# Patient Record
Sex: FEMALE | Race: WHITE | Employment: PART TIME | URBAN - METROPOLITAN AREA
[De-identification: names, ages, dates, MRNs, and addresses within clinical notes are randomized per-mention and may not be internally consistent; named-entity substitution may affect disease eponyms.]

---

## 2017-06-12 DIAGNOSIS — F32.A DEPRESSION, UNSPECIFIED DEPRESSION TYPE: ICD-10-CM

## 2017-06-12 RX ORDER — FLUOXETINE HYDROCHLORIDE 20 MG/1
20 CAPSULE ORAL DAILY
Qty: 30 CAP | Refills: 11 | Status: SHIPPED | OUTPATIENT
Start: 2017-06-12 | End: 2017-10-20 | Stop reason: SDUPTHER

## 2017-06-26 RX ORDER — SERTRALINE HYDROCHLORIDE 50 MG/1
50 TABLET, FILM COATED ORAL DAILY
Qty: 90 TAB | Refills: 3 | OUTPATIENT
Start: 2017-06-26

## 2017-06-26 NOTE — TELEPHONE ENCOUNTER
Refused refill request for Zoloft. Most recent note from Dr. Tisa Lundborg says that she is taking Prozac, not Zoloft.   Need more information on what she needs refilled

## 2017-06-28 NOTE — TELEPHONE ENCOUNTER
Still unable to reach her or leave a message on either line. Called [facility] and pharmacist said last Rx filled was for Prozac 30 day supply in January and she didn't request a refill. He said he thinks they sent it by mistake.

## 2017-10-20 ENCOUNTER — OFFICE VISIT (OUTPATIENT)
Dept: FAMILY MEDICINE CLINIC | Age: 32
End: 2017-10-20

## 2017-10-20 ENCOUNTER — TELEPHONE (OUTPATIENT)
Dept: FAMILY MEDICINE CLINIC | Age: 32
End: 2017-10-20

## 2017-10-20 VITALS
HEIGHT: 64 IN | HEART RATE: 69 BPM | RESPIRATION RATE: 17 BRPM | SYSTOLIC BLOOD PRESSURE: 113 MMHG | OXYGEN SATURATION: 98 % | BODY MASS INDEX: 24.24 KG/M2 | DIASTOLIC BLOOD PRESSURE: 74 MMHG | WEIGHT: 142 LBS | TEMPERATURE: 98 F

## 2017-10-20 DIAGNOSIS — F32.A DEPRESSION, UNSPECIFIED DEPRESSION TYPE: Primary | ICD-10-CM

## 2017-10-20 DIAGNOSIS — Z13.29 SCREENING FOR THYROID DISORDER: ICD-10-CM

## 2017-10-20 DIAGNOSIS — G43.901 MIGRAINE WITH STATUS MIGRAINOSUS, NOT INTRACTABLE, UNSPECIFIED MIGRAINE TYPE: ICD-10-CM

## 2017-10-20 DIAGNOSIS — Z13.220 SCREENING FOR LIPID DISORDERS: ICD-10-CM

## 2017-10-20 DIAGNOSIS — E66.3 OVERWEIGHT: ICD-10-CM

## 2017-10-20 RX ORDER — BUTALBITAL, ACETAMINOPHEN, CAFFEINE AND CODEINE PHOSPHATE 50; 325; 40; 30 MG/1; MG/1; MG/1; MG/1
1 CAPSULE ORAL
Qty: 30 CAP | Refills: 0 | Status: SHIPPED | OUTPATIENT
Start: 2017-10-20 | End: 2020-02-27 | Stop reason: ALTCHOICE

## 2017-10-20 RX ORDER — ATENOLOL 25 MG/1
25 TABLET ORAL DAILY
Qty: 30 TAB | Refills: 3 | Status: SHIPPED | OUTPATIENT
Start: 2017-10-20 | End: 2020-02-27 | Stop reason: ALTCHOICE

## 2017-10-20 RX ORDER — FLUOXETINE HYDROCHLORIDE 20 MG/1
20 CAPSULE ORAL EVERY EVENING
Qty: 30 CAP | Refills: 11 | Status: SHIPPED | OUTPATIENT
Start: 2017-10-20 | End: 2020-02-27 | Stop reason: ALTCHOICE

## 2017-10-20 NOTE — PATIENT INSTRUCTIONS
Depression Treatment: Care Instructions  Your Care Instructions  Depression is a condition that affects the way you feel, think, and act. It causes symptoms such as low energy, loss of interest in daily activities, and sadness or grouchiness that goes on for a long time. Depression is very common and affects men and women of all ages. Depression is a medical illness caused by changes in the natural chemicals in your brain. It is not a character flaw, and it does not mean that you are a bad or weak person. It does not mean that you are going crazy. It is important to know that depression can be treated. Medicines, counseling, and self-care can all help. Many people do not get help because they are embarrassed or think that they will get over the depression on their own. But some people do not get better without treatment. Follow-up care is a key part of your treatment and safety. Be sure to make and go to all appointments, and call your doctor if you are having problems. It's also a good idea to know your test results and keep a list of the medicines you take. How can you care for yourself at home? Learn about antidepressant medicines  Antidepressant medicines can improve or end the symptoms of depression. You may need to take the medicine for at least 6 months, and often longer. Keep taking your medicine even if you feel better. If you stop taking it too soon, your symptoms may come back or get worse. You may start to feel better within 1 to 3 weeks of taking antidepressant medicine. But it can take as many as 6 to 8 weeks to see more improvement. Talk to your doctor if you have problems with your medicine or if you do not notice any improvement after 3 weeks. Antidepressants can make you feel tired, dizzy, or nervous. Some people have dry mouth, constipation, headaches, sexual problems, an upset stomach, or diarrhea.  Many of these side effects are mild and go away on their own after you take the medicine for a few weeks. Some may last longer. Talk to your doctor if side effects bother you too much. You might be able to try a different medicine. If you are pregnant or breastfeeding, talk to your doctor about what medicines you can take. Learn about counseling  In many cases, counseling can work as well as medicines to treat mild to moderate depression. Counseling is done by licensed mental health providers, such as psychologists, social workers, and some types of nurses. It can be done in one-on-one sessions or in a group setting. Many people find group sessions helpful. Cognitive-behavioral therapy is a type of counseling. In this treatment therapy, you learn how to see and change unhelpful thinking styles that may be adding to your depression. Counseling and medicines often work well when used together. To manage depression  · Be physically active. Getting 30 minutes of exercise each day is good for your body and your mind. Begin slowly if it is hard for you to get started. If you already exercise, keep it up. · Plan something pleasant for yourself every day. Include activities that you have enjoyed in the past.  · Get enough sleep. Talk to your doctor if you have problems sleeping. · Eat a balanced diet. If you do not feel hungry, eat small snacks rather than large meals. · Do not drink alcohol, use illegal drugs, or take medicines that your doctor has not prescribed for you. They may interfere with your treatment. · Spend time with family and friends. It may help to speak openly about your depression with people you trust.  · Take your medicines exactly as prescribed. Call your doctor if you think you are having a problem with your medicine. · Do not make major life decisions while you are depressed. Depression may change the way you think. You will be able to make better decisions after you feel better. · Think positively.  Challenge negative thoughts with statements such as \"I am hopeful\"; \"Things will get better\"; and \"I can ask for the help I need. \" Write down these statements and read them often, even if you don't believe them yet. · Be patient with yourself. It took time for your depression to develop, and it will take time for your symptoms to improve. Do not take on too much or be too hard on yourself. · Learn all you can about depression from written and online materials. · Check out behavioral health classes to learn more about dealing with depression. · Keep the numbers for these national suicide hotlines: 3-959-450-TALK (5-885.843.2919) and 7-131-PJCSIOD (5-594.201.4981). If you or someone you know talks about suicide or feeling hopeless, get help right away. When should you call for help? Call 911 anytime you think you may need emergency care. For example, call if:  · You feel you cannot stop from hurting yourself or someone else. Call your doctor now or seek immediate medical care if:  · You hear voices. · You feel much more depressed. Watch closely for changes in your health, and be sure to contact your doctor if:  · You are having problems with your depression medicine. · You are not getting better as expected. Where can you learn more? Go to http://malcom-raphael.info/. Enter R179 in the search box to learn more about \"Depression Treatment: Care Instructions. \"  Current as of: July 26, 2016  Content Version: 11.3  © 4965-2492 [employer]. Care instructions adapted under license by [employer] (which disclaims liability or warranty for this information). If you have questions about a medical condition or this instruction, always ask your healthcare professional. [place] [address] any warranty or liability for your use of this information. Migraine Headache: Care Instructions  Your Care Instructions  Migraines are painful, throbbing headaches that often start on one side of the head.  They may cause nausea and vomiting and make you sensitive to light, sound, or smell. Without treatment, migraines can last from 4 hours to a few days. Medicines can help prevent migraines or stop them after they have started. Your doctor can help you find which ones work best for you. Follow-up care is a key part of your treatment and safety. Be sure to make and go to all appointments, and call your doctor if you are having problems. It's also a good idea to know your test results and keep a list of the medicines you take. How can you care for yourself at home? · Do not drive if you have taken a prescription pain medicine. · Rest in a quiet, dark room until your headache is gone. Close your eyes, and try to relax or go to sleep. Don't watch TV or read. · Put a cold, moist cloth or cold pack on the painful area for 10 to 20 minutes at a time. Put a thin cloth between the cold pack and your skin. · Use a warm, moist towel or a heating pad set on low to relax tight shoulder and neck muscles. · Have someone gently massage your neck and shoulders. · Take your medicines exactly as prescribed. Call your doctor if you think you are having a problem with your medicine. You will get more details on the specific medicines your doctor prescribes. · Be careful not to take pain medicine more often than the instructions allow. You could get worse or more frequent headaches when the medicine wears off. To prevent migraines  · Keep a headache diary so you can figure out what triggers your headaches. Avoiding triggers may help you prevent headaches. Record when each headache began, how long it lasted, and what the pain was like. (Was it throbbing, aching, stabbing, or dull?) Write down any other symptoms you had with the headache, such as nausea, flashing lights or dark spots, or sensitivity to bright light or loud noise. Note if the headache occurred near your period. List anything that might have triggered the headache.  Triggers may include certain foods (chocolate, cheese, wine) or odors, smoke, bright light, stress, or lack of sleep. · If your doctor has prescribed medicine for your migraines, take it as directed. You may have medicine that you take only when you get a migraine and medicine that you take all the time to help prevent migraines. ¨ If your doctor has prescribed medicine for when you get a headache, take it at the first sign of a migraine, unless your doctor has given you other instructions. ¨ If your doctor has prescribed medicine to prevent migraines, take it exactly as prescribed. Call your doctor if you think you are having a problem with your medicine. · Find healthy ways to deal with stress. Migraines are most common during or right after stressful times. Take time to relax before and after you do something that has caused a migraine in the past.  · Try to keep your muscles relaxed by keeping good posture. Check your jaw, face, neck, and shoulder muscles for tension. Try to relax them. When you sit at a desk, change positions often. And make sure to stretch for 30 seconds each hour. · Get plenty of sleep and exercise. · Eat meals on a regular schedule. Avoid foods and drinks that often trigger migraines. These include chocolate, alcohol (especially red wine and port), aspartame, monosodium glutamate (MSG), and some additives found in foods (such as hot dogs, verma, cold cuts, aged cheeses, and pickled foods). · Limit caffeine. Don't drink too much coffee, tea, or soda. But don't quit caffeine suddenly. That can also give you migraines. · Do not smoke or allow others to smoke around you. If you need help quitting, talk to your doctor about stop-smoking programs and medicines. These can increase your chances of quitting for good. · If you are taking birth control pills or hormone therapy, talk to your doctor about whether they are triggering your migraines. When should you call for help?   Call 911 anytime you think you may need emergency care. For example, call if:  · You have signs of a stroke. These may include:  ¨ Sudden numbness, paralysis, or weakness in your face, arm, or leg, especially on only one side of your body. ¨ Sudden vision changes. ¨ Sudden trouble speaking. ¨ Sudden confusion or trouble understanding simple statements. ¨ Sudden problems with walking or balance. ¨ A sudden, severe headache that is different from past headaches. Call your doctor now or seek immediate medical care if:  · You have new or worse nausea and vomiting. · You have a new or higher fever. · Your headache gets much worse. Watch closely for changes in your health, and be sure to contact your doctor if:  · You are not getting better after 2 days (48 hours). Where can you learn more? Go to http://malcom-raphael.info/. Enter W751 in the search box to learn more about \"Migraine Headache: Care Instructions. \"  Current as of: October 14, 2016  Content Version: 11.3  © 7648-9269 [employer]. Care instructions adapted under license by [employer] (which disclaims liability or warranty for this information). If you have questions about a medical condition or this instruction, always ask your healthcare professional. [place] [address] any warranty or liability for your use of this information.

## 2017-10-20 NOTE — MR AVS SNAPSHOT
Visit Information Date & Time Provider Department Dept. Phone Encounter #  
 10/20/2017 10:00 AM Aminata Mcmahon, [facility] [place] [address] [phone] [id number] Follow-up Instructions Return in about 4 weeks (around 11/17/2017), or if symptoms worsen or fail to improve. Upcoming Health Maintenance Date Due DTaP/Tdap/Td series (1 - Tdap) 2/15/2004 INFLUENZA AGE 9 TO ADULT 8/1/2017 PAP AKA CERVICAL CYTOLOGY 1/13/2019 Allergies as of 10/20/2017  Review Complete On: 10/20/2017 By: Aminata Mcmahon NP Severity Noted Reaction Type Reactions Imitrex [Sumatriptan Succinate] Medium 11/20/2013   Systemic Other (comments) Made patient feel funny Current Immunizations  Reviewed on 12/9/2014 Name Date Influenza Vaccine Gabriella Bloodgood) 12/9/2014 Not reviewed this visit You Were Diagnosed With   
  
 Codes Comments Depression, unspecified depression type    -  Primary ICD-10-CM: F32.9 ICD-9-CM: 880 Migraine with status migrainosus, not intractable, unspecified migraine type     ICD-10-CM: G43.901 ICD-9-CM: 346.92 Vitals BP Pulse Temp Resp Height(growth percentile) Weight(growth percentile) 113/74 (BP 1 Location: Left arm, BP Patient Position: Sitting) 69 98 °F (36.7 °C) (Oral) 17 5' 4\" (1.626 m) 142 lb (64.4 kg) SpO2 Breastfeeding? BMI OB Status Smoking Status 98% No 24.37 kg/m2 Unknown Never Smoker Vitals History BMI and BSA Data Body Mass Index Body Surface Area  
 24.37 kg/m 2 1.71 m 2 Preferred Pharmacy Pharmacy Name Phone [facility] [address] [facility], [facility] [phone] Your Updated Medication List  
  
   
This list is accurate as of: 10/20/17 10:36 AM.  Always use your most recent med list.  
  
  
  
  
 atenolol 25 mg tablet Commonly known as:  TENORMIN Take 1 Tab by mouth daily. codeine-butalbital-acetaminophen-caffeine -64-30 mg per capsule Commonly known as:  FIORICET WITH CODEINE Take 1 Cap by mouth every six (6) hours as needed for Migraine. FLUoxetine 20 mg capsule Commonly known as:  PROzac Take 1 Cap by mouth every evening. phentermine 37.5 mg tablet Commonly known as:  ADIPEX-P Take 1 Tab by mouth every morning. Max Daily Amount: 37.5 mg.  
  
  
  
  
Prescriptions Printed Refills  
 codeine-butalbital-acetaminophen-caffeine (FIORICET WITH CODEINE) -40-30 mg per capsule 0 Sig: Take 1 Cap by mouth every six (6) hours as needed for Migraine. Class: Print Route: Oral  
  
Prescriptions Sent to Pharmacy Refills FLUoxetine (PROZAC) 20 mg capsule 11 Sig: Take 1 Cap by mouth every evening. Class: Normal  
 Pharmacy: [address], [facility] Ph #: [phone] Route: Oral  
 atenolol (TENORMIN) 25 mg tablet 3 Sig: Take 1 Tab by mouth daily. Class: Normal  
 Pharmacy: [address], [facility] Ph #: [phone] Route: Oral  
  
Follow-up Instructions Return in about 4 weeks (around 11/17/2017), or if symptoms worsen or fail to improve. Patient Instructions Depression Treatment: Care Instructions Your Care Instructions Depression is a condition that affects the way you feel, think, and act. It causes symptoms such as low energy, loss of interest in daily activities, and sadness or grouchiness that goes on for a long time. Depression is very common and affects men and women of all ages. Depression is a medical illness caused by changes in the natural chemicals in your brain. It is not a character flaw, and it does not mean that you are a bad or weak person. It does not mean that you are going crazy. It is important to know that depression can be treated.  Medicines, counseling, and self-care can all help. Many people do not get help because they are embarrassed or think that they will get over the depression on their own. But some people do not get better without treatment. Follow-up care is a key part of your treatment and safety. Be sure to make and go to all appointments, and call your doctor if you are having problems. It's also a good idea to know your test results and keep a list of the medicines you take. How can you care for yourself at home? Learn about antidepressant medicines Antidepressant medicines can improve or end the symptoms of depression. You may need to take the medicine for at least 6 months, and often longer. Keep taking your medicine even if you feel better. If you stop taking it too soon, your symptoms may come back or get worse. You may start to feel better within 1 to 3 weeks of taking antidepressant medicine. But it can take as many as 6 to 8 weeks to see more improvement. Talk to your doctor if you have problems with your medicine or if you do not notice any improvement after 3 weeks. Antidepressants can make you feel tired, dizzy, or nervous. Some people have dry mouth, constipation, headaches, sexual problems, an upset stomach, or diarrhea. Many of these side effects are mild and go away on their own after you take the medicine for a few weeks. Some may last longer. Talk to your doctor if side effects bother you too much. You might be able to try a different medicine. If you are pregnant or breastfeeding, talk to your doctor about what medicines you can take. Learn about counseling In many cases, counseling can work as well as medicines to treat mild to moderate depression. Counseling is done by licensed mental health providers, such as psychologists, social workers, and some types of nurses. It can be done in one-on-one sessions or in a group setting. Many people find group sessions helpful. Cognitive-behavioral therapy is a type of counseling. In this treatment therapy, you learn how to see and change unhelpful thinking styles that may be adding to your depression. Counseling and medicines often work well when used together. To manage depression · Be physically active. Getting 30 minutes of exercise each day is good for your body and your mind. Begin slowly if it is hard for you to get started. If you already exercise, keep it up. · Plan something pleasant for yourself every day. Include activities that you have enjoyed in the past. 
· Get enough sleep. Talk to your doctor if you have problems sleeping. · Eat a balanced diet. If you do not feel hungry, eat small snacks rather than large meals. · Do not drink alcohol, use illegal drugs, or take medicines that your doctor has not prescribed for you. They may interfere with your treatment. · Spend time with family and friends. It may help to speak openly about your depression with people you trust. 
· Take your medicines exactly as prescribed. Call your doctor if you think you are having a problem with your medicine. · Do not make major life decisions while you are depressed. Depression may change the way you think. You will be able to make better decisions after you feel better. · Think positively. Challenge negative thoughts with statements such as \"I am hopeful\"; \"Things will get better\"; and \"I can ask for the help I need. \" Write down these statements and read them often, even if you don't believe them yet. · Be patient with yourself. It took time for your depression to develop, and it will take time for your symptoms to improve. Do not take on too much or be too hard on yourself. · Learn all you can about depression from written and online materials. · Check out behavioral health classes to learn more about dealing with depression.  
· Keep the numbers for these national suicide hotlines: 4-930-154-TALK (5-106-732-898.309.8721) and 9-121-TBJUHMT (2-805.930.1439). If you or someone you know talks about suicide or feeling hopeless, get help right away. When should you call for help? Call 911 anytime you think you may need emergency care. For example, call if: 
· You feel you cannot stop from hurting yourself or someone else. Call your doctor now or seek immediate medical care if: 
· You hear voices. · You feel much more depressed. Watch closely for changes in your health, and be sure to contact your doctor if: 
· You are having problems with your depression medicine. · You are not getting better as expected. Where can you learn more? Go to http://malcom-raphael.info/. Enter D586 in the search box to learn more about \"Depression Treatment: Care Instructions. \" Current as of: July 26, 2016 Content Version: 11.3 © 2176-8626 AppBrick. Care instructions adapted under license by [employer] (which disclaims liability or warranty for this information). If you have questions about a medical condition or this instruction, always ask your healthcare professional. [place] [address] any warranty or liability for your use of this information. Migraine Headache: Care Instructions Your Care Instructions Migraines are painful, throbbing headaches that often start on one side of the head. They may cause nausea and vomiting and make you sensitive to light, sound, or smell. Without treatment, migraines can last from 4 hours to a few days. Medicines can help prevent migraines or stop them after they have started. Your doctor can help you find which ones work best for you. Follow-up care is a key part of your treatment and safety. Be sure to make and go to all appointments, and call your doctor if you are having problems. It's also a good idea to know your test results and keep a list of the medicines you take. How can you care for yourself at home? · Do not drive if you have taken a prescription pain medicine. · Rest in a quiet, dark room until your headache is gone. Close your eyes, and try to relax or go to sleep. Don't watch TV or read. · Put a cold, moist cloth or cold pack on the painful area for 10 to 20 minutes at a time. Put a thin cloth between the cold pack and your skin. · Use a warm, moist towel or a heating pad set on low to relax tight shoulder and neck muscles. · Have someone gently massage your neck and shoulders. · Take your medicines exactly as prescribed. Call your doctor if you think you are having a problem with your medicine. You will get more details on the specific medicines your doctor prescribes. · Be careful not to take pain medicine more often than the instructions allow. You could get worse or more frequent headaches when the medicine wears off. To prevent migraines · Keep a headache diary so you can figure out what triggers your headaches. Avoiding triggers may help you prevent headaches. Record when each headache began, how long it lasted, and what the pain was like. (Was it throbbing, aching, stabbing, or dull?) Write down any other symptoms you had with the headache, such as nausea, flashing lights or dark spots, or sensitivity to bright light or loud noise. Note if the headache occurred near your period. List anything that might have triggered the headache. Triggers may include certain foods (chocolate, cheese, wine) or odors, smoke, bright light, stress, or lack of sleep. · If your doctor has prescribed medicine for your migraines, take it as directed. You may have medicine that you take only when you get a migraine and medicine that you take all the time to help prevent migraines. ¨ If your doctor has prescribed medicine for when you get a headache, take it at the first sign of a migraine, unless your doctor has given you other instructions.  
¨ If your doctor has prescribed medicine to prevent migraines, take it exactly as prescribed. Call your doctor if you think you are having a problem with your medicine. · Find healthy ways to deal with stress. Migraines are most common during or right after stressful times. Take time to relax before and after you do something that has caused a migraine in the past. 
· Try to keep your muscles relaxed by keeping good posture. Check your jaw, face, neck, and shoulder muscles for tension. Try to relax them. When you sit at a desk, change positions often. And make sure to stretch for 30 seconds each hour. · Get plenty of sleep and exercise. · Eat meals on a regular schedule. Avoid foods and drinks that often trigger migraines. These include chocolate, alcohol (especially red wine and port), aspartame, monosodium glutamate (MSG), and some additives found in foods (such as hot dogs, verma, cold cuts, aged cheeses, and pickled foods). · Limit caffeine. Don't drink too much coffee, tea, or soda. But don't quit caffeine suddenly. That can also give you migraines. · Do not smoke or allow others to smoke around you. If you need help quitting, talk to your doctor about stop-smoking programs and medicines. These can increase your chances of quitting for good. · If you are taking birth control pills or hormone therapy, talk to your doctor about whether they are triggering your migraines. When should you call for help? Call 911 anytime you think you may need emergency care. For example, call if: 
· You have signs of a stroke. These may include: 
¨ Sudden numbness, paralysis, or weakness in your face, arm, or leg, especially on only one side of your body. ¨ Sudden vision changes. ¨ Sudden trouble speaking. ¨ Sudden confusion or trouble understanding simple statements. ¨ Sudden problems with walking or balance. ¨ A sudden, severe headache that is different from past headaches. Call your doctor now or seek immediate medical care if: 
· You have new or worse nausea and vomiting. · You have a new or higher fever. · Your headache gets much worse. Watch closely for changes in your health, and be sure to contact your doctor if: 
· You are not getting better after 2 days (48 hours). Where can you learn more? Go to http://malcom-raphael.info/. Enter A424 in the search box to learn more about \"Migraine Headache: Care Instructions. \" Current as of: October 14, 2016 Content Version: 11.3 © 8913-5193 AutoNavi. Care instructions adapted under license by Graph[employer]ly (which disclaims liability or warranty for this information). If you have questions about a medical condition or this instruction, always ask your healthcare professional. [place] [address] any warranty or liability for your use of this information. Introducing [facility]! [address] introduces Cnekt patient portal. Now you can access parts of your medical record, email your doctor's office, and request medication refills online. 1. In your internet browser, go to [email or web address] 2. Click on the First Time User? Click Here link in the Sign In box. You will see the New Member Sign Up page. 3. Enter your Cnekt Access Code exactly as it appears below. You will not need to use this code after youve completed the sign-up process. If you do not sign up before the expiration date, you must request a new code. · Cnekt Access Code: Ganga Coker Expires: 1/18/2018  8:43 AM 
 
4. Enter the last four digits of your Social Security Number (xxxx) and Date of Birth (mm/dd/yyyy) as indicated and click Submit. You will be taken to the next sign-up page. 5. Create a Cnekt ID. This will be your Cnekt login ID and cannot be changed, so think of one that is secure and easy to remember. 6. Create a Cnekt password. You can change your password at any time. 7. Enter your Password Reset Question and Answer.  This can be used at a later time if you forget your password. 8. Enter your e-mail address. You will receive e-mail notification when new information is available in [address] 9. Click Sign Up. You can now view and download portions of your medical record. 10. Click the Download Summary menu link to download a portable copy of your medical information. If you have questions, please visit the Frequently Asked Questions section of the BaseKit website. Remember, BaseKit is NOT to be used for urgent needs. For medical emergencies, dial 911. Now available from your iPhone and Android! Please provide this summary of care documentation to your next provider. Your primary care clinician is listed as CORBY PERAZA. If you have any questions after today's visit, please call [phone].

## 2017-10-20 NOTE — PROGRESS NOTES
Chief Complaint   Patient presents with    Migraine     patient wants medication for her Migraine    Medication Evaluation     needs to discuss her antidepressant medications    Medication Refill     phentermine and antidepressants     Patient declined the flu vaccine. Tristen Gramajo LPN

## 2017-10-20 NOTE — LETTER
NOTIFICATION RETURN TO WORK / SCHOOL 
 
10/20/2017 10:35 AM 
 
Ms. [address] [place] [address] [address] [facility] [address] To Whom It May Concern: 
 
Addison Sosa is currently under the care of [address] [place] [address]. She is being treated for depression and migraines and is prescribed daily medication for treatment. If there are questions or concerns please have the patient contact our office. Sincerely, Kiki Denver, NP

## 2017-10-20 NOTE — PROGRESS NOTES
Chief Complaint   Patient presents with    Migraine     patient wants medication for her Migraine    Medication Evaluation     needs to discuss her antidepressant medications    Medication Refill     phentermine and antidepressants       Ryanne Uribe is a 27 y.o. female who presents for complaints of depression/anxiety. Was on Prozac and says that was working well for her but she stopped taking the medication about 4-6 months \"I thought I did not need it anymore and could treat myself\" but says that about 2-3 months ago, she started to feel \"bad\" again. Extremely burton and snapping at kids (no patience), \"meltdowns\", tearful all the time, and having increase in migraines and not sleeping well. Her  was recently diagnosed with stage 4 cancer and she has had a lot of stressors related. She has 3 kids, ages 15, 6, and 3. She denies recreational drugs or ETOH. Denies SI/HI. Says she has a counseling appointment scheduled for next month. She also inquires about restarting phentermine, says that when she stresses out she overeats. She was prescribed the phentermine in the past by Dr Amarilis Pedroza. History of migraines- used to be on atenolol daily for prevention and Fioricet PRN acute migraine. When she was on her antidepressant she says she was not really having migraines but over the past month she has been having a migraine 2-3 times per week and lately, having a headache almost daily. She was able to stop the atenolol when she was on her Prozac regularly. Past Medical History:   Diagnosis Date    Depression 11/28/2016    Kidney stone     Ovarian cyst     Ovarian cyst, left     UTI (urinary tract infection)      Past Surgical History:   Procedure Laterality Date    HX GYN      tubaligation     Social History     Social History    Marital status:      Spouse name: N/A    Number of children: N/A    Years of education: N/A     Occupational History    Not on file. Social History Main Topics    Smoking status: Never Smoker    Smokeless tobacco: Never Used    Alcohol use Yes      Comment: socially    Drug use: No    Sexual activity: Yes     Partners: Male     Birth control/ protection: None     Other Topics Concern    Not on file     Social History Narrative     Allergies   Allergen Reactions    Imitrex [Sumatriptan Succinate] Other (comments)     Made patient feel funny           O;   Visit Vitals    /74 (BP 1 Location: Left arm, BP Patient Position: Sitting)    Pulse 69    Temp 98 °F (36.7 °C) (Oral)    Resp 17    Ht 5' 4\" (1.626 m)    Wt 142 lb (64.4 kg)    SpO2 98%    Breastfeeding No  Comment: patient is spotting only at times    BMI 24.37 kg/m2     Gen: alert, oriented, no acute distress  Head: normocephalic, atraumatic  Ears: external auditory canals clear, TMs without erythema or effusion  Eyes: pupils equal round reactive to light, sclera clear, conjunctiva clear  Nose: normal turbinates, no rhinorrhea  Oral: moist mucus membranes, no oral lesions, no pharyngeal inflammation or exudate  Neck: supple, no lymphadenopathy  Resp: no increased work of breathing, lungs clear to ausculation bilaterally  CV: S1, S2 normal, no murmurs, rubs, or gallops. Abd: soft, not tender, not distended. No hepatosplenomegaly. Normal bowel sounds. No hernias. Neuro: cranial nerves intact, normal strength and movement in all extremities, reflexes and sensation intact and symmetric. Skin: no lesion or rash  PSYCH:  Good eye contact, rational thoughts. Tearful during exam.    A/P:  Differential diagnosis and treatment options reviewed with patient who is in agreement with treatment plan as outlined below. ICD-10-CM ICD-9-CM    1. Depression, unspecified depression type F32.9 311 FLUoxetine (PROZAC) 20 mg capsule      METABOLIC PANEL, COMPREHENSIVE      CBC WITH AUTOMATED DIFF   2.  Migraine with status migrainosus, not intractable, unspecified migraine type G43.901 346.92 atenolol (TENORMIN) 25 mg tablet      codeine-butalbital-acetaminophen-caffeine (FIORICET WITH CODEINE) -51-30 mg per capsule      METABOLIC PANEL, COMPREHENSIVE      CBC WITH AUTOMATED DIFF   3. Overweight E66.3 278.02    4. Screening for thyroid disorder Z13.29 V77.0 TSH 3RD GENERATION   5. Screening for lipid disorders Z13.220 V77.91 LIPID PANEL       Will restart prozac and atenolol. Medication profiles discussed with patient. Discussed causes, disease state, treatment alternatives, possible side effects, and expected time to effectiveness for depression. Patient is aware of increased risk of suicidality when starting treatment and contracts for safety. Patient made aware that counseling in conjunction with medication works best to treat depression. Also discussed benefits of journaling symptoms and regular exercise during recovery. Patient agrees to treatment, identifies a source of social support, and will follow up here in 1 month. Spent >30 minutes face to face with patient, >50% coordinating care. Discussed BMI and healthy weight. BMI is not at a level that is appropriate for treatment with Phentermine but also explained that phentermine could induce her migraines and potentially increase her anxiety. Encouraged patient to work to implement changes including diet high in raw fruits and vegetables, lean protein and good fats. Limit refined, processed carbohydrates and sugar. Encouraged regular exercise. She states understanding. Fasting lab work ordered. She will return as lab visit. Follow up in 4-6 weeks or sooner if needed    Verbal and written instructions (see AVS) provided. Patient expresses understanding of diagnosis and treatment plan.

## 2017-10-23 ENCOUNTER — TELEPHONE (OUTPATIENT)
Dept: FAMILY MEDICINE CLINIC | Age: 32
End: 2017-10-23

## 2017-10-25 ENCOUNTER — LAB ONLY (OUTPATIENT)
Dept: FAMILY MEDICINE CLINIC | Age: 32
End: 2017-10-25

## 2017-10-26 LAB
ALBUMIN SERPL-MCNC: 4.3 G/DL (ref 3.5–5.5)
ALBUMIN/GLOB SERPL: 1.8 {RATIO} (ref 1.2–2.2)
ALP SERPL-CCNC: 58 IU/L (ref 39–117)
ALT SERPL-CCNC: 8 IU/L (ref 0–32)
AST SERPL-CCNC: 13 IU/L (ref 0–40)
BASOPHILS # BLD AUTO: 0 X10E3/UL (ref 0–0.2)
BASOPHILS NFR BLD AUTO: 1 %
BILIRUB SERPL-MCNC: 0.6 MG/DL (ref 0–1.2)
BUN SERPL-MCNC: 12 MG/DL (ref 6–20)
BUN/CREAT SERPL: 18 (ref 9–23)
CALCIUM SERPL-MCNC: 8.7 MG/DL (ref 8.7–10.2)
CHLORIDE SERPL-SCNC: 102 MMOL/L (ref 96–106)
CHOLEST SERPL-MCNC: 137 MG/DL (ref 100–199)
CO2 SERPL-SCNC: 25 MMOL/L (ref 18–29)
CREAT SERPL-MCNC: 0.67 MG/DL (ref 0.57–1)
EOSINOPHIL # BLD AUTO: 0.3 X10E3/UL (ref 0–0.4)
EOSINOPHIL NFR BLD AUTO: 4 %
ERYTHROCYTE [DISTWIDTH] IN BLOOD BY AUTOMATED COUNT: 13.6 % (ref 12.3–15.4)
GFR SERPLBLD CREATININE-BSD FMLA CKD-EPI: 115 ML/MIN/1.73
GFR SERPLBLD CREATININE-BSD FMLA CKD-EPI: 133 ML/MIN/1.73
GLOBULIN SER CALC-MCNC: 2.4 G/DL (ref 1.5–4.5)
GLUCOSE SERPL-MCNC: 83 MG/DL (ref 65–99)
HCT VFR BLD AUTO: 36 % (ref 34–46.6)
HDLC SERPL-MCNC: 46 MG/DL
HGB BLD-MCNC: 12 G/DL (ref 11.1–15.9)
IMM GRANULOCYTES # BLD: 0 X10E3/UL (ref 0–0.1)
IMM GRANULOCYTES NFR BLD: 0 %
INTERPRETATION, 910389: NORMAL
LDLC SERPL CALC-MCNC: 80 MG/DL (ref 0–99)
LYMPHOCYTES # BLD AUTO: 1.6 X10E3/UL (ref 0.7–3.1)
LYMPHOCYTES NFR BLD AUTO: 23 %
MCH RBC QN AUTO: 31.8 PG (ref 26.6–33)
MCHC RBC AUTO-ENTMCNC: 33.3 G/DL (ref 31.5–35.7)
MCV RBC AUTO: 96 FL (ref 79–97)
MONOCYTES # BLD AUTO: 0.4 X10E3/UL (ref 0.1–0.9)
MONOCYTES NFR BLD AUTO: 6 %
NEUTROPHILS # BLD AUTO: 4.8 X10E3/UL (ref 1.4–7)
NEUTROPHILS NFR BLD AUTO: 66 %
PLATELET # BLD AUTO: 199 X10E3/UL (ref 150–379)
POTASSIUM SERPL-SCNC: 4.2 MMOL/L (ref 3.5–5.2)
PROT SERPL-MCNC: 6.7 G/DL (ref 6–8.5)
RBC # BLD AUTO: 3.77 X10E6/UL (ref 3.77–5.28)
SODIUM SERPL-SCNC: 141 MMOL/L (ref 134–144)
TRIGL SERPL-MCNC: 54 MG/DL (ref 0–149)
TSH SERPL DL<=0.005 MIU/L-ACNC: 2.12 UIU/ML (ref 0.45–4.5)
VLDLC SERPL CALC-MCNC: 11 MG/DL (ref 5–40)
WBC # BLD AUTO: 7.1 X10E3/UL (ref 3.4–10.8)

## 2017-12-12 ENCOUNTER — DOCUMENTATION ONLY (OUTPATIENT)
Dept: FAMILY MEDICINE CLINIC | Age: 32
End: 2017-12-12

## 2017-12-12 NOTE — PROGRESS NOTES
12/12/17 Called [address] [place] [address] spoke with Simin Hightowre to follow up on note in system for Fioricet. No PA needed claim went thru without charge and will be filled stated Simin Hightower.

## 2018-01-16 ENCOUNTER — OFFICE VISIT (OUTPATIENT)
Dept: FAMILY MEDICINE CLINIC | Age: 33
End: 2018-01-16

## 2018-01-16 VITALS
RESPIRATION RATE: 14 BRPM | TEMPERATURE: 98.4 F | SYSTOLIC BLOOD PRESSURE: 126 MMHG | BODY MASS INDEX: 24.92 KG/M2 | HEART RATE: 74 BPM | HEIGHT: 64 IN | OXYGEN SATURATION: 99 % | WEIGHT: 146 LBS | DIASTOLIC BLOOD PRESSURE: 88 MMHG

## 2018-01-16 DIAGNOSIS — N30.00 ACUTE CYSTITIS WITHOUT HEMATURIA: ICD-10-CM

## 2018-01-16 DIAGNOSIS — M79.18 MYOFASCIAL PAIN: ICD-10-CM

## 2018-01-16 DIAGNOSIS — F41.1 GAD (GENERALIZED ANXIETY DISORDER): ICD-10-CM

## 2018-01-16 DIAGNOSIS — G44.219 EPISODIC TENSION-TYPE HEADACHE, NOT INTRACTABLE: Primary | ICD-10-CM

## 2018-01-16 DIAGNOSIS — R35.0 URINARY FREQUENCY: ICD-10-CM

## 2018-01-16 LAB
BILIRUB UR QL STRIP: NEGATIVE
GLUCOSE UR-MCNC: NEGATIVE MG/DL
KETONES P FAST UR STRIP-MCNC: NEGATIVE MG/DL
PH UR STRIP: 7 [PH] (ref 4.6–8)
PROT UR QL STRIP: NORMAL
SP GR UR STRIP: 1.02 (ref 1–1.03)
UA UROBILINOGEN AMB POC: NORMAL (ref 0.2–1)
URINALYSIS CLARITY POC: NORMAL
URINALYSIS COLOR POC: YELLOW
URINE BLOOD POC: NEGATIVE
URINE LEUKOCYTES POC: NORMAL
URINE NITRITES POC: POSITIVE

## 2018-01-16 RX ORDER — CIPROFLOXACIN 250 MG/1
250 TABLET, FILM COATED ORAL EVERY 12 HOURS
Qty: 6 TAB | Refills: 0 | Status: SHIPPED | OUTPATIENT
Start: 2018-01-16 | End: 2018-01-19

## 2018-01-16 RX ORDER — DULOXETIN HYDROCHLORIDE 30 MG/1
30 CAPSULE, DELAYED RELEASE ORAL DAILY
Qty: 90 CAP | Refills: 3 | Status: SHIPPED | OUTPATIENT
Start: 2018-01-16 | End: 2020-02-27 | Stop reason: ALTCHOICE

## 2018-01-16 NOTE — MR AVS SNAPSHOT
[address] [facility] 
 
 
 [address] [facility] 
[phone] Patient: Aliyah Neves MRN: [id number] FLOREZ:[id number] Visit Information Date & Time Provider Department Dept. Phone Encounter #  
 1/16/2018  3:20 PM Tarsha Mcbride MD [address] [facility] [phone] [id number] Upcoming Health Maintenance Date Due  
 PAP AKA CERVICAL CYTOLOGY 1/13/2019 DTaP/Tdap/Td series (2 - Td) 10/20/2027 Allergies as of 1/16/2018  Review Complete On: 1/16/2018 By: Julee Mahan Severity Noted Reaction Type Reactions Imitrex [Sumatriptan Succinate] Medium 11/20/2013   Systemic Other (comments) Made patient feel funny Current Immunizations  Reviewed on 12/9/2014 Name Date Influenza Vaccine Partpascale Devi) 12/9/2014 Not reviewed this visit You Were Diagnosed With   
  
 Codes Comments Urinary frequency    -  Primary ICD-10-CM: R35.0 ICD-9-CM: 788.41   
 CARSON (generalized anxiety disorder)     ICD-10-CM: F41.1 ICD-9-CM: 300.02 Episodic tension-type headache, not intractable     ICD-10-CM: S10.035 ICD-9-CM: 339.11 Myofascial pain     ICD-10-CM: M79.1 ICD-9-CM: 729.1 Acute cystitis without hematuria     ICD-10-CM: N30.00 ICD-9-CM: 595.0 Vitals BP Pulse Temp Resp Height(growth percentile) Weight(growth percentile) (!) 145/93 (BP 1 Location: Left arm, BP Patient Position: Sitting) 74 98.4 °F (36.9 °C) 14 5' 4\" (1.626 m) 146 lb (66.2 kg) SpO2 BMI OB Status Smoking Status 99% 25.06 kg/m2 Unknown Never Smoker BMI and BSA Data Body Mass Index Body Surface Area 25.06 kg/m 2 1.73 m 2 Preferred Pharmacy Pharmacy Name Phone [facility] [address], [address]co [address] [facility] [phone] Your Updated Medication List  
  
   
This list is accurate as of: 1/16/18  4:31 PM.  Always use your most recent med list.  
  
  
  
  
 atenolol 25 mg tablet Commonly known as:  TENORMIN Take 1 Tab by mouth daily. ciprofloxacin HCl 250 mg tablet Commonly known as:  CIPRO Take 1 Tab by mouth every twelve (12) hours for 3 days. codeine-butalbital-acetaminophen-caffeine -27-30 mg capsule Commonly known as:  FIORICET WITH CODEINE Take 1 Cap by mouth every six (6) hours as needed for Migraine. DULoxetine 30 mg capsule Commonly known as:  CYMBALTA Take 1 Cap by mouth daily. FLUoxetine 20 mg capsule Commonly known as:  PROzac Take 1 Cap by mouth every evening. Prescriptions Sent to Pharmacy Refills  
 ciprofloxacin HCl (CIPRO) 250 mg tablet 0 Sig: Take 1 Tab by mouth every twelve (12) hours for 3 days. Class: Normal  
 Pharmacy: [address] [place] [address], [address] [facility] Ph #: [phone] Route: Oral  
 DULoxetine (CYMBALTA) 30 mg capsule 3 Sig: Take 1 Cap by mouth daily. Class: Normal  
 Pharmacy: [address] [place] [address], [facility] [address] [facility] Ph #: [phone] Route: Oral  
  
We Performed the Following AMB POC URINALYSIS DIP STICK MANUAL W/O MICRO [50426 CPT(R)] Patient Instructions Neck Strain or Sprain: Rehab Exercises Your Care Instructions Here are some examples of typical rehabilitation exercises for your condition. Start each exercise slowly. Ease off the exercise if you start to have pain. Your doctor or physical therapist will tell you when you can start these exercises and which ones will work best for you. How to do the exercises Neck rotation 1. Sit in a firm chair, or stand up straight. 2. Keeping your chin level, turn your head to the right, and hold for 15 to 30 seconds. 3. Turn your head to the left and hold for 15 to 30 seconds. 4. Repeat 2 to 4 times to each side. Neck stretches 1. Look straight ahead, and tip your right ear to your right shoulder.  Do not let your left shoulder rise up as you tip your head to the right. 2. Hold for 15 to 30 seconds. 3. Tilt your head to the left. Do not let your right shoulder rise up as you tip your head to the left. 4. Hold for 15 to 30 seconds. 5. Repeat 2 to 4 times to each side. Forward neck flexion 1. Sit in a firm chair, or stand up straight. 2. Bend your head forward. 3. Hold for 15 to 30 seconds. 4. Repeat 2 to 4 times. Lateral (side) bend strengthening 1. With your right hand, place your first two fingers on your right temple. 2. Start to bend your head to the side while using gentle pressure from your fingers to keep your head from bending. 3. Hold for about 6 seconds. 4. Repeat 8 to 12 times. 5. Switch hands and repeat the same exercise on your left side. Forward bend strengthening 1. Place your first two fingers of either hand on your forehead. 2. Start to bend your head forward while using gentle pressure from your fingers to keep your head from bending. 3. Hold for about 6 seconds. 4. Repeat 8 to 12 times. Neutral position strengthening 1. Using one hand, place your fingertips on the back of your head at the top of your neck. 2. Start to bend your head backward while using gentle pressure from your fingers to keep your head from bending. 3. Hold for about 6 seconds. 4. Repeat 8 to 12 times. Chin tuck 1. Lie on the floor with a rolled-up towel under your neck. Your head should be touching the floor. 2. Slowly bring your chin toward your chest. 
3. Hold for a count of 6, and then relax for up to 10 seconds. 4. Repeat 8 to 12 times. Follow-up care is a key part of your treatment and safety. Be sure to make and go to all appointments, and call your doctor if you are having problems. It's also a good idea to know your test results and keep a list of the medicines you take. Where can you learn more? Go to http://malcom-raphael.info/. Enter M679 in the search box to learn more about \"Neck Strain or Sprain: Rehab Exercises. \" Current as of: March 21, 2017 Content Version: 11.4 © 0353-0430 [employer]ift[employer]. Care instructions adapted under license by [employer] (which disclaims liability or warranty for this information). If you have questions about a medical condition or this instruction, always ask your healthcare professional. [address] any warranty or liability for your use of this information. Introducing [facility]! Dear Lex Lizarraga: Thank you for requesting a [employer] account. Our records indicate that you already have an active [employer] account. You can access your account anytime at [email or web address] Did you know that you can access your hospital and ER discharge instructions at any time in [employer]? You can also review all of your test results from your hospital stay or ER visit. Additional Information If you have questions, please visit the Frequently Asked Questions section of the [employer] website at [email or web address] Remember, [employer] is NOT to be used for urgent needs. For medical emergencies, dial 911. Now available from your iPhone and Android! Please provide this summary of care documentation to your next provider. Your primary care clinician is listed as CORBY PERAZA. If you have any questions after today's visit, please call [phone].

## 2018-01-16 NOTE — PATIENT INSTRUCTIONS
Neck Strain or Sprain: Rehab Exercises  Your Care Instructions  Here are some examples of typical rehabilitation exercises for your condition. Start each exercise slowly. Ease off the exercise if you start to have pain. Your doctor or physical therapist will tell you when you can start these exercises and which ones will work best for you. How to do the exercises  Neck rotation    1. Sit in a firm chair, or stand up straight. 2. Keeping your chin level, turn your head to the right, and hold for 15 to 30 seconds. 3. Turn your head to the left and hold for 15 to 30 seconds. 4. Repeat 2 to 4 times to each side. Neck stretches    1. Look straight ahead, and tip your right ear to your right shoulder. Do not let your left shoulder rise up as you tip your head to the right. 2. Hold for 15 to 30 seconds. 3. Tilt your head to the left. Do not let your right shoulder rise up as you tip your head to the left. 4. Hold for 15 to 30 seconds. 5. Repeat 2 to 4 times to each side. Forward neck flexion    1. Sit in a firm chair, or stand up straight. 2. Bend your head forward. 3. Hold for 15 to 30 seconds. 4. Repeat 2 to 4 times. Lateral (side) bend strengthening    1. With your right hand, place your first two fingers on your right temple. 2. Start to bend your head to the side while using gentle pressure from your fingers to keep your head from bending. 3. Hold for about 6 seconds. 4. Repeat 8 to 12 times. 5. Switch hands and repeat the same exercise on your left side. Forward bend strengthening    1. Place your first two fingers of either hand on your forehead. 2. Start to bend your head forward while using gentle pressure from your fingers to keep your head from bending. 3. Hold for about 6 seconds. 4. Repeat 8 to 12 times. Neutral position strengthening    1. Using one hand, place your fingertips on the back of your head at the top of your neck.   2. Start to bend your head backward while using gentle pressure from your fingers to keep your head from bending. 3. Hold for about 6 seconds. 4. Repeat 8 to 12 times. Chin tuck    1. Lie on the floor with a rolled-up towel under your neck. Your head should be touching the floor. 2. Slowly bring your chin toward your chest.  3. Hold for a count of 6, and then relax for up to 10 seconds. 4. Repeat 8 to 12 times. Follow-up care is a key part of your treatment and safety. Be sure to make and go to all appointments, and call your doctor if you are having problems. It's also a good idea to know your test results and keep a list of the medicines you take. Where can you learn more? Go to http://malcom-raphael.info/. Enter M679 in the search box to learn more about \"Neck Strain or Sprain: Rehab Exercises. \"  Current as of: March 21, 2017  Content Version: 11.4  © 1178-8691 Healthwise, Incorporated. Care instructions adapted under license by [employer] (which disclaims liability or warranty for this information). If you have questions about a medical condition or this instruction, always ask your healthcare professional. [address] any warranty or liability for your use of this information.

## 2018-01-16 NOTE — PROGRESS NOTES
..  Chief Complaint   Patient presents with    Migraine    Back Pain    Urinary Frequency     . Juan Laura

## 2018-01-16 NOTE — PROGRESS NOTES
AUSTIN Coyne Aas is a 27 y.o. female who presents with a headache for the last 4 days and urinary frequency. Urinary symptoms since yesterday. Headache is an ongoing issue. She estimates that she has 3 headache days per month. This headache which has been going on for 4 days is the longest that she has ever had. Her headaches are typically bifrontal and can involve the back of the head and neck at times. Rarely if ever has unilateral headache but does frequently get nausea, light sensitivity, sound sensitivity  with her headaches. She has been trying Fioricet, Tylenol, Advil. Has been taking these so frequently that she now gets and upset stomach when she uses them. Cannot recall exactly how many days out of the week she has been taking Tylenol but thinks it is conceivable that she has been using it daily for a while    Is also seen for mood disorder a few months ago and started back on Prozac. Evidently she also wanted phentermine with this medication because Prozac \"makes me a zombie\" and she needed the phentermine to put some pep in her step. Talked about how bad of an idea this was. Apparently she is under a lot of stress right now because of her . He has stage IV colon cancer and is in a lot of pain. He was given 2 years to live and then did not tell his wife for 1 year so this came as a shock when she found out 6 months ago. The kids do not know. He is in a lot of pain but does not want pain medication because he has a history of \"a problem\" with prescription pain medication and he does not want to go down that road again. PMHx:  Past Medical History:   Diagnosis Date    Depression 11/28/2016    Kidney stone     Ovarian cyst     Ovarian cyst, left     UTI (urinary tract infection)        Meds:   Current Outpatient Prescriptions   Medication Sig Dispense Refill    ciprofloxacin HCl (CIPRO) 250 mg tablet Take 1 Tab by mouth every twelve (12) hours for 3 days.  6 Tab 0  DULoxetine (CYMBALTA) 30 mg capsule Take 1 Cap by mouth daily. 90 Cap 3    FLUoxetine (PROZAC) 20 mg capsule Take 1 Cap by mouth every evening. 30 Cap 11    atenolol (TENORMIN) 25 mg tablet Take 1 Tab by mouth daily. 30 Tab 3    codeine-butalbital-acetaminophen-caffeine (FIORICET WITH CODEINE) -36-30 mg per capsule Take 1 Cap by mouth every six (6) hours as needed for Migraine. 30 Cap 0       Allergies: Allergies   Allergen Reactions    Imitrex [Sumatriptan Succinate] Other (comments)     Made patient feel funny       Smoker:  History   Smoking Status    Never Smoker   Smokeless Tobacco    Never Used       ETOH:   History   Alcohol Use    Yes     Comment: socially       FH:   Family History   Problem Relation Age of Onset    Cancer Mother     Asthma Maternal Aunt        ROS:   As listed in HPI. In addition:  Constitutional:   No headache, fever, fatigue, weight loss or weight gain      Cardiac:    No chest pain      Resp:   No cough or shortness of breath      Neuro   No loss of consciousness, dizziness, seizures      Physical Exam:  Blood pressure 126/88, pulse 74, temperature 98.4 °F (36.9 °C), resp. rate 14, height 5' 4\" (1.626 m), weight 146 lb (66.2 kg), SpO2 99 %. GEN: No apparent distress. Alert and oriented and responds to all questions appropriately. NEUROLOGIC:  No focal neurologic deficits. Strength and sensation grossly intact. Coordination and gait grossly intact. EXT: Well perfused. No edema. SKIN: No obvious rashes. Neck: Rectus capitis muscles are exquisitely tender to palpation bilaterally, scalenes exquisitely tender on the left more so than the right, sternocleidomastoid exquisitely tender in the left more so than the right, superior trapezius trigger points both medially and laterally bilaterally, inferior trapezius trigger point bilaterally.   Trigger points throughout the erector spinae a at about the level of L4-L5 both medially and laterally       Assessment and Plan     In all >14 tender points. Pretty impressive myofascial pattern. Employ a cold stretch technique on the specific tender points that were referring pain to her head specifically the left superior trapezius. This resulted in immediate relief of her headache. Also employed isometric exercises on the splenius capitis with good relief. Talked about how heating pad, NSAIDs, stretches could be used in a similar fashion. Because of the extent of myofascial pain I suspect that her anxiety is playing a major role. This led to a discussion about how she is not under good control. Does not feel like the Prozac has made any impact. Offered increased dose versus change to medication like Cymbalta. She would like to try a change. Will switch over to Cymbalta and slightly increased relative dose to 30 mg. Would expect headache to go away in a few days if she is doing all of the things we talked about  Would expect improvement in mood in about 2 weeks on the Cymbalta. Stop taking Prozac  Stop taking Fioricet  Back off on Tylenol  NSAIDs as needed-if upsetting stomach can use with Nexium    UTI  Cipro  Urine culture    Follow-up 2-4 weeks      ICD-10-CM ICD-9-CM    1. Episodic tension-type headache, not intractable G44.219 339.11    2. Urinary frequency R35.0 788.41 AMB POC URINALYSIS DIP STICK MANUAL W/O MICRO      ciprofloxacin HCl (CIPRO) 250 mg tablet   3. CARSON (generalized anxiety disorder) F41.1 300.02 DULoxetine (CYMBALTA) 30 mg capsule   4. Myofascial pain M79.1 729.1    5. Acute cystitis without hematuria N30.00 595.0 ciprofloxacin HCl (CIPRO) 250 mg tablet      CULTURE, URINE       AVS given.  Pt expressed understanding of instructions

## 2018-01-18 ENCOUNTER — TELEPHONE (OUTPATIENT)
Dept: FAMILY MEDICINE CLINIC | Age: 33
End: 2018-01-18

## 2018-01-18 LAB — BACTERIA UR CULT: ABNORMAL

## 2018-01-23 RX ORDER — NITROFURANTOIN 25; 75 MG/1; MG/1
100 CAPSULE ORAL 2 TIMES DAILY
Qty: 14 CAP | Refills: 0 | Status: SHIPPED | OUTPATIENT
Start: 2018-01-23 | End: 2018-01-30

## 2018-01-23 NOTE — TELEPHONE ENCOUNTER
Pt states that she is still having symptoms of UTI. Script sent to [facility] per Dr. Jed Hsu.

## 2018-01-23 NOTE — TELEPHONE ENCOUNTER
Cipro should have only been a 3 day course. I assume that because it was making her feel sick she has not been taking it quite as directed. Is she is still having UTI symptoms? .  If so we should be able to treat it  with Macrobid (pended order).   If she is no longer having symptoms of UTI then it is very possible that it resolved on its own and she does not need antibiotics

## 2018-01-23 NOTE — TELEPHONE ENCOUNTER
Patient says that antibiotic is making her sick. She would like to get something else if possible. She also says it is making her shaky.  She can be reached at [phone]

## 2020-02-27 ENCOUNTER — OFFICE VISIT (OUTPATIENT)
Dept: FAMILY MEDICINE CLINIC | Age: 35
End: 2020-02-27

## 2020-02-27 VITALS
OXYGEN SATURATION: 99 % | DIASTOLIC BLOOD PRESSURE: 85 MMHG | BODY MASS INDEX: 25.27 KG/M2 | HEIGHT: 64 IN | RESPIRATION RATE: 17 BRPM | HEART RATE: 76 BPM | SYSTOLIC BLOOD PRESSURE: 130 MMHG | WEIGHT: 148 LBS | TEMPERATURE: 99.1 F

## 2020-02-27 DIAGNOSIS — E66.3 OVERWEIGHT: ICD-10-CM

## 2020-02-27 DIAGNOSIS — G44.89 ALLERGIC HEADACHE: Primary | ICD-10-CM

## 2020-02-27 DIAGNOSIS — G43.909 MIGRAINE: ICD-10-CM

## 2020-02-27 RX ORDER — FLUTICASONE PROPIONATE 50 MCG
2 SPRAY, SUSPENSION (ML) NASAL DAILY
Qty: 1 BOTTLE | Refills: 3 | Status: SHIPPED | OUTPATIENT
Start: 2020-02-27 | End: 2020-04-26 | Stop reason: SDUPTHER

## 2020-02-27 RX ORDER — BUTALBITAL, ACETAMINOPHEN AND CAFFEINE 50; 325; 40 MG/1; MG/1; MG/1
1 TABLET ORAL
Qty: 30 TAB | Refills: 0 | Status: SHIPPED | OUTPATIENT
Start: 2020-02-27 | End: 2020-04-26 | Stop reason: SDUPTHER

## 2020-02-27 RX ORDER — PHENTERMINE HYDROCHLORIDE 37.5 MG/1
37.5 TABLET ORAL
Qty: 30 TAB | Refills: 1 | Status: SHIPPED | OUTPATIENT
Start: 2020-02-27 | End: 2020-05-07 | Stop reason: SDUPTHER

## 2020-02-27 RX ORDER — MONTELUKAST SODIUM 10 MG/1
10 TABLET ORAL DAILY
Qty: 30 TAB | Refills: 5 | Status: SHIPPED | OUTPATIENT
Start: 2020-02-27 | End: 2020-04-26 | Stop reason: SDUPTHER

## 2020-02-27 NOTE — PROGRESS NOTES
Chief Complaint   Patient presents with    Headache    Sore Throat    Nasal Congestion    Sneezing    Fatigue     Pt reports that she normally take zyrtec for her allergies, does not feel like it is working anymore. Patient reports that for the last 2 weeks she has had increased headaches sore throat, nasal congestion, sneezing and fatigue. Patient reports that she feels as though her allergy symptoms are contributing to recurrent frequent headaches. Patient is very concerned about her weight, reports that has been increasing despite efforts to maintain. Patient reports that she has been on phentermine in the past, would like to resume for a short period of time to help her lose weight. Subjective: (As above and below)     Chief Complaint   Patient presents with    Headache    Sore Throat    Nasal Congestion    Sneezing    Fatigue     she is a 38y.o. year old female who presents for evaluation. Reviewed PmHx, RxHx, FmHx, SocHx, AllgHx and updated in chart. Review of Systems - negative except as listed above    Objective:     Vitals:    02/27/20 1602   BP: 130/85   Pulse: 76   Resp: 17   Temp: 99.1 °F (37.3 °C)   TempSrc: Oral   SpO2: 99%   Weight: 148 lb (67.1 kg)   Height: 5' 4\" (1.626 m)     Physical Examination: General appearance - alert, well appearing, and in no distress  Mental status - normal mood, behavior, speech, dress, motor activity, and thought processes  Eyes - pupils equal and reactive, extraocular eye movements intact  Mouth - mucous membranes moist, pharynx normal without lesions  Nasal congestion  Chest - clear to auscultation, no wheezes, rales or rhonchi, symmetric air entry  Heart - normal rate, regular rhythm, normal S1, S2, no murmurs, rubs, clicks or gallops  Musculoskeletal - no joint tenderness, deformity or swelling  Extremities - peripheral pulses normal, no pedal edema, no clubbing or cyanosis    Assessment/ Plan:   1.  Migraine  -Reviewed as needed use of medication to help with acute headache  - butalbital-acetaminophen-caffeine (FIORICET, ESGIC) -40 mg per tablet; Take 1 Tab by mouth every six (6) hours as needed for Headache. Dispense: 30 Tab; Refill: 0    2. Allergic headache  Start on Singulair, use Flonase to help control allergy symptoms. Refer to allergy for additional testing and treatment  - fluticasone propionate (FLONASE) 50 mcg/actuation nasal spray; 2 Sprays by Both Nostrils route daily. Dispense: 1 Bottle; Refill: 3  - montelukast (SINGULAIR) 10 mg tablet; Take 1 Tab by mouth daily. Dispense: 30 Tab; Refill: 5  - REFERRAL TO ALLERGY    3. Overweight  Start on phentermine as written, reviewed with patient that I had some concern relatively normal BMI. Will use for short-term use, with close follow-up  - phentermine (ADIPEX-P) 37.5 mg tablet; Take 1 Tab by mouth every morning. Max Daily Amount: 37.5 mg.  Dispense: 30 Tab; Refill: 1       I have discussed the diagnosis with the patient and the intended plan as seen in the above orders. The patient has received an after-visit summary and questions were answered concerning future plans.      Medication Side Effects and Warnings were discussed with patient: yes  Patient Labs were reviewed: yes  Patient Past Records were reviewed:  yes    Mayra Sharma M.D.

## 2020-02-27 NOTE — PROGRESS NOTES
Chief Complaint   Patient presents with    Headache    Sore Throat    Nasal Congestion    Sneezing    Fatigue     Health Maintenance reviewed     1. Have you been to the ER, urgent care clinic since your last visit? Hospitalized since your last visit? No    2. Have you seen or consulted any other health care providers outside of the [address] [place] [address] since your last visit? Include any pap smears or colon screening.   No

## 2020-03-13 ENCOUNTER — TELEPHONE (OUTPATIENT)
Dept: FAMILY MEDICINE CLINIC | Age: 35
End: 2020-03-13

## 2020-03-13 NOTE — TELEPHONE ENCOUNTER
Patient was prescribed Butalbital from Dr. Elvira Roy but she doesn't like the medication. Side effects: having migranes, not with it, not focusing and just not helping. Patient wants to be prescribed something else.     Pharmacy: [facility] [phone]

## 2020-03-14 NOTE — TELEPHONE ENCOUNTER
I can see that imitrex made pt feel funny, would she be willing to try a cousin to this medication or would she prefer to try a daily migraine preventative.

## 2020-03-16 RX ORDER — ELETRIPTAN HYDROBROMIDE 20 MG/1
20 TABLET, FILM COATED ORAL
Qty: 9 TAB | Refills: 5 | Status: SHIPPED | OUTPATIENT
Start: 2020-03-16 | End: 2020-03-23 | Stop reason: SDUPTHER

## 2020-03-17 ENCOUNTER — TELEPHONE (OUTPATIENT)
Dept: FAMILY MEDICINE CLINIC | Age: 35
End: 2020-03-17

## 2020-03-17 NOTE — TELEPHONE ENCOUNTER
Patient states, \"needs authorization for Eletriptan or prescribe another medication to [facility] in [place]\".

## 2020-03-18 ENCOUNTER — TELEPHONE (OUTPATIENT)
Dept: INTERNAL MEDICINE CLINIC | Age: 35
End: 2020-03-18

## 2020-03-18 RX ORDER — RIZATRIPTAN BENZOATE 10 MG/1
10 TABLET ORAL
Qty: 9 TAB | Refills: 2 | Status: SHIPPED | OUTPATIENT
Start: 2020-03-18 | End: 2020-11-16

## 2020-03-18 NOTE — TELEPHONE ENCOUNTER
3/18/20 Plan called @   PA completed for Eletriptan HRB 20 mg Tab with Coral DELUNA,Ref # [id number] state Relpax is a preferred. Decision will be fax to office with in 24 hours. [id number] called Sammy Landers state no script received on Eletriptan please follow up Thanks

## 2020-03-23 RX ORDER — ELETRIPTAN HYDROBROMIDE 20 MG/1
20 TABLET, FILM COATED ORAL
Qty: 9 TAB | Refills: 5 | Status: SHIPPED | OUTPATIENT
Start: 2020-03-23 | End: 2020-03-23

## 2020-03-23 NOTE — TELEPHONE ENCOUNTER
Requested Prescriptions     Pending Prescriptions Disp Refills    eletriptan (Relpax) 20 mg tablet 9 Tab 5     Sig: Take 1 Tab by mouth once as needed for Migraine for up to 1 dose.  may repeat in 2 hours if necessary

## 2020-04-26 DIAGNOSIS — E66.3 OVERWEIGHT: ICD-10-CM

## 2020-04-26 DIAGNOSIS — G44.89 ALLERGIC HEADACHE: ICD-10-CM

## 2020-04-26 DIAGNOSIS — G43.909 MIGRAINE: ICD-10-CM

## 2020-04-27 RX ORDER — PHENTERMINE HYDROCHLORIDE 37.5 MG/1
37.5 TABLET ORAL
Qty: 30 TAB | Refills: 1 | OUTPATIENT
Start: 2020-04-27

## 2020-04-27 RX ORDER — BUTALBITAL, ACETAMINOPHEN AND CAFFEINE 50; 325; 40 MG/1; MG/1; MG/1
1 TABLET ORAL
Qty: 30 TAB | Refills: 0 | Status: SHIPPED | OUTPATIENT
Start: 2020-04-27 | End: 2020-07-13

## 2020-04-27 RX ORDER — FLUTICASONE PROPIONATE 50 MCG
2 SPRAY, SUSPENSION (ML) NASAL DAILY
Qty: 1 BOTTLE | Refills: 3 | Status: SHIPPED | OUTPATIENT
Start: 2020-04-27 | End: 2020-08-28 | Stop reason: SDUPTHER

## 2020-04-27 RX ORDER — MONTELUKAST SODIUM 10 MG/1
10 TABLET ORAL DAILY
Qty: 30 TAB | Refills: 5 | Status: SHIPPED | OUTPATIENT
Start: 2020-04-27 | End: 2020-08-28 | Stop reason: SDUPTHER

## 2020-05-05 DIAGNOSIS — E66.3 OVERWEIGHT: ICD-10-CM

## 2020-05-05 NOTE — TELEPHONE ENCOUNTER
Message from Evgeny Malhotra/ Refill   Received: Today   Message Contents   [place], [facility] Office Pool             Caller (if not patient): N/A   Relationship of caller (if not patient): N/A   Best contact number(s): [phone]   Name of medication and dosage if known: \"Phentermine 37.5\"   Is patient out of this medication (yes/no): No   Pharmacy name: [address] [place] [address] listed in chart? (yes/no): Yes   Pharmacy phone number: [phone]   Date of last visit: Thursday, February 27, 2020 03:05 PM   Details to clarify the request: Pt stated  that she is not out of her medication yet . Pt stated that she will need a refill by 5 /24/ 2020 or 5/28/2020. Requested Prescriptions     Pending Prescriptions Disp Refills    phentermine (ADIPEX-P) 37.5 mg tablet 30 Tab 1     Sig: Take 1 Tab by mouth every morning.  Max Daily Amount: 37.5 mg.

## 2020-05-06 RX ORDER — PHENTERMINE HYDROCHLORIDE 37.5 MG/1
37.5 TABLET ORAL
Qty: 30 TAB | Refills: 1 | OUTPATIENT
Start: 2020-05-06

## 2020-05-07 ENCOUNTER — VIRTUAL VISIT (OUTPATIENT)
Dept: FAMILY MEDICINE CLINIC | Age: 35
End: 2020-05-07

## 2020-05-07 VITALS — BODY MASS INDEX: 24.41 KG/M2 | WEIGHT: 143 LBS | HEIGHT: 64 IN

## 2020-05-07 DIAGNOSIS — Z76.89 ENCOUNTER FOR WEIGHT MANAGEMENT: ICD-10-CM

## 2020-05-07 DIAGNOSIS — G43.701 CHRONIC MIGRAINE WITHOUT AURA WITH STATUS MIGRAINOSUS, NOT INTRACTABLE: Primary | ICD-10-CM

## 2020-05-07 RX ORDER — PHENTERMINE HYDROCHLORIDE 37.5 MG/1
37.5 TABLET ORAL
Qty: 30 TAB | Refills: 1 | Status: SHIPPED | OUTPATIENT
Start: 2020-05-07 | End: 2021-04-08 | Stop reason: SDUPTHER

## 2020-05-07 NOTE — PROGRESS NOTES
Chief Complaint   Patient presents with    Headache    Medication Refill     phentermine     1. Have you been to the ER, urgent care clinic since your last visit? Hospitalized since your last visit? No    2. Have you seen or consulted any other health care providers outside of the [address] [place] [address] since your last visit? Include any pap smears or colon screening.  No    Health Maintenance Due   Topic Date Due    PAP AKA CERVICAL CYTOLOGY  01/13/2019

## 2020-05-07 NOTE — PROGRESS NOTES
Chief Complaint   Patient presents with    Headache    Medication Refill     phentermine     Pt was seen via MyChart video visit. Pt reports that her headaches have increased with a vengeance. Pt reports that Fioricet helps with her headaches for the first few days, but has not helped last night or today. Pt had to leave work today due to symptoms. Pt reports that she has tried several triptans, help initially but then stop working. Pt was on a daily medication once in the past, does not remember what it was. Suzy Huggins is a 38 y.o. female who was seen by synchronous (real-time) audio-video technology on 5/7/2020. Consent: Suzy Huggins, who was seen by synchronous (real-time) audio-video technology, and/or her healthcare decision maker, is aware that this patient-initiated, Telehealth encounter on 5/7/2020 is a billable service, with coverage as determined by her insurance carrier. She is aware that she may receive a bill and has provided verbal consent to proceed: Yes. Assessment & Plan:   1. Chronic migraine without aura with status migrainosus, not intractable  -trial of new medication, refer to neurology  - ubrogepomega Yuan) 50 mg tablet; Take 1 Tab by mouth once as needed for Migraine for up to 1 dose. Dispense: 12 Tab; Refill: 0  - REFERRAL TO NEUROLOGY    2. Encounter for weight management  -uses every 3rd day  - phentermine (ADIPEX-P) 37.5 mg tablet; Take 1 Tab by mouth every morning. Max Daily Amount: 37.5 mg.  Dispense: 30 Tab; Refill: 1      Subjective:   Suzy Huggins is a 38 y.o. female who was seen for Headache and Medication Refill (phentermine)      Prior to Admission medications    Medication Sig Start Date End Date Taking? Authorizing Provider   fluticasone propionate (FLONASE) 50 mcg/actuation nasal spray 2 Sprays by Both Nostrils route daily. 4/27/20  Yes Viky Malhotra MD   montelukast (SINGULAIR) 10 mg tablet Take 1 Tab by mouth daily.  4/27/20  Yes Marce Malhotra MD   butalbital-acetaminophen-caffeine (FIORICET, ESGIC) -40 mg per tablet Take 1 Tab by mouth every six (6) hours as needed for Headache. 4/27/20  Yes Marce Malhotra MD   phentermine (ADIPEX-P) 37.5 mg tablet Take 1 Tab by mouth every morning.  Max Daily Amount: 37.5 mg. 2/27/20  Yes Marce Malhotra MD     Allergies   Allergen Reactions    Imitrex [Sumatriptan Succinate] Other (comments)     Made patient feel funny       Patient Active Problem List   Diagnosis Code    Overweight E66.3    Postoperative vaginal bleeding [id number]    Endometriosis N80.9    Depression F32.9       ROS    Objective:   Vital Signs: (As obtained by patient/caregiver at home)  Visit Vitals   5' 4\" (1.626 m)   Wt 143 lb (64.9 kg)   LMP 04/14/2020   BMI 24.55 kg/m²        [INSTRUCTIONS:  \"[x]\" Indicates a positive item  \"[]\" Indicates a negative item  -- DELETE ALL ITEMS NOT EXAMINED]    Constitutional: [x] Appears well-developed and well-nourished [x] No apparent distress      [] Abnormal -     Mental status: [x] Alert and awake  [x] Oriented to person/place/time [x] Able to follow commands    [] Abnormal -     Eyes:   EOM    [x]  Normal    [] Abnormal -   Sclera  [x]  Normal    [] Abnormal -          Discharge [x]  None visible   [] Abnormal -     HENT: [x] Normocephalic, atraumatic  [] Abnormal -   [x] Mouth/Throat: Mucous membranes are moist    External Ears [x] Normal  [] Abnormal -    Neck: [x] No visualized mass [] Abnormal -     Pulmonary/Chest: [x] Respiratory effort normal   [x] No visualized signs of difficulty breathing or respiratory distress        [] Abnormal -      Musculoskeletal:   [x] Normal gait with no signs of ataxia         [x] Normal range of motion of neck        [] Abnormal -     Neurological:        [x] No Facial Asymmetry (Cranial nerve 7 motor function) (limited exam due to video visit)          [x] No gaze palsy        [] Abnormal -          Skin:        [x] No significant exanthematous lesions or discoloration noted on facial skin         [] Abnormal -            Psychiatric:       [x] Normal Affect [] Abnormal -        [x] No Hallucinations    Other pertinent observable physical exam findings:-        We discussed the expected course, resolution and complications of the diagnosis(es) in detail. Medication risks, benefits, costs, interactions, and alternatives were discussed as indicated. I advised her to contact the office if her condition worsens, changes or fails to improve as anticipated. She expressed understanding with the diagnosis(es) and plan. Deidre Santiago is a 38 y.o. female who was evaluated by a video visit encounter for concerns as above. Patient identification was verified prior to start of the visit. A caregiver was present when appropriate. Due to this being a TeleHealth encounter (During WJGUN-58 public health emergency), evaluation of the following organ systems was limited: Vitals/Constitutional/EENT/Resp/CV/GI//MS/Neuro/Skin/Heme-Lymph-Imm. Pursuant to the emergency declaration under the [facility], 1135 waiver authority and the [employer] and [employer] Act, this Virtual  Visit was conducted, with patient's (and/or legal guardian's) consent, to reduce the patient's risk of exposure to COVID-19 and provide necessary medical care. Services were provided through a video synchronous discussion virtually to substitute for in-person clinic visit. Patient and provider were located at their individual homes.       Redd Garcia MD

## 2020-07-11 DIAGNOSIS — G43.909 MIGRAINE: ICD-10-CM

## 2020-07-13 RX ORDER — BUTALBITAL, ACETAMINOPHEN AND CAFFEINE 50; 325; 40 MG/1; MG/1; MG/1
TABLET ORAL
Qty: 30 TAB | Refills: 0 | Status: SHIPPED | OUTPATIENT
Start: 2020-07-13 | End: 2021-01-20 | Stop reason: SDUPTHER

## 2020-08-28 DIAGNOSIS — G44.89 ALLERGIC HEADACHE: ICD-10-CM

## 2020-08-28 DIAGNOSIS — Z76.89 ENCOUNTER FOR WEIGHT MANAGEMENT: ICD-10-CM

## 2020-08-29 RX ORDER — PHENTERMINE HYDROCHLORIDE 37.5 MG/1
37.5 TABLET ORAL
Qty: 30 TAB | Refills: 1 | OUTPATIENT
Start: 2020-08-29

## 2020-08-29 RX ORDER — FLUTICASONE PROPIONATE 50 MCG
2 SPRAY, SUSPENSION (ML) NASAL DAILY
Qty: 1 BOTTLE | Refills: 3 | Status: SHIPPED | OUTPATIENT
Start: 2020-08-29 | End: 2021-04-01 | Stop reason: SDUPTHER

## 2020-08-29 RX ORDER — MONTELUKAST SODIUM 10 MG/1
10 TABLET ORAL DAILY
Qty: 30 TAB | Refills: 5 | Status: SHIPPED | OUTPATIENT
Start: 2020-08-29 | End: 2021-01-20 | Stop reason: SDUPTHER

## 2020-08-29 RX ORDER — FLUTICASONE PROPIONATE 50 MCG
2 SPRAY, SUSPENSION (ML) NASAL DAILY
Qty: 1 BOTTLE | Refills: 3 | Status: SHIPPED | OUTPATIENT
Start: 2020-08-29 | End: 2020-10-21 | Stop reason: SDUPTHER

## 2020-09-07 DIAGNOSIS — G43.701 CHRONIC MIGRAINE WITHOUT AURA WITH STATUS MIGRAINOSUS, NOT INTRACTABLE: ICD-10-CM

## 2020-09-08 RX ORDER — UBROGEPANT 50 MG/1
TABLET ORAL
Qty: 12 TAB | Refills: 0 | Status: SHIPPED | OUTPATIENT
Start: 2020-09-08 | End: 2020-11-16

## 2020-09-16 ENCOUNTER — TELEPHONE (OUTPATIENT)
Dept: FAMILY MEDICINE CLINIC | Age: 35
End: 2020-09-16

## 2020-09-16 RX ORDER — IBUPROFEN 800 MG/1
800 TABLET ORAL
Qty: 60 TAB | Refills: 1 | Status: SHIPPED | OUTPATIENT
Start: 2020-09-16 | End: 2021-01-20 | Stop reason: SDUPTHER

## 2020-09-16 NOTE — TELEPHONE ENCOUNTER
----- Message from Bibi. Felicia Vargas sent at 9/16/2020  7:51 AM EDT -----  Regarding: Prescription Question  Contact: [phone]  Good morning, my arthritis in my neck is starting up again. I know before NP Garrick Woods use to give me prescription strength ibuprofen. It helped some. I was wondering if there was anyway I could be prescribed that or something else for my arthritis. With the past few colder nights it has reminded me that my arthritis is still there.

## 2020-09-22 ENCOUNTER — TELEPHONE (OUTPATIENT)
Dept: FAMILY MEDICINE CLINIC | Age: 35
End: 2020-09-22

## 2020-09-22 RX ORDER — CYCLOBENZAPRINE HCL 10 MG
10 TABLET ORAL
Qty: 30 TAB | Refills: 0 | Status: SHIPPED | OUTPATIENT
Start: 2020-09-22

## 2020-09-22 NOTE — TELEPHONE ENCOUNTER
----- Message from BibiDavonte Shelton sent at 9/17/2020  1:12 PM EDT -----  Regarding: RE: Prescription Question  Contact: [phone]  I have tried methocarbamol before. It didn't really do much. But I'm willing to try anything. Thank you [facility] in [place] is fine. ----- Message -----  From: Kristi Mendez MD  Sent: 9/17/20, 12:53 PM  To: Gabrielle Gould  Subject: RE: Prescription Question    Dear Ms. Brandt Shelton,    Have you ever taken muscle relaxers? We could try this to help with the acute spasm and tension. Please let me know if you would like a prescription sent to your pharmacy on file. Sincerely,    Petty Malhotra MD   [facility] [address]  [phone]        ----- Message -----       From:Estela Neff       Sent:6/14/26  8:40 AM EDT         To:Martell Malhotra MD    Subject:RE: Prescription Question    Good morning, I have taken the ibuprofen. Still no relief, it helps a very little. It hurts to look down at times I  get dizzy. The colder the weather gets I shiver and it hurts worse. I'm sorry for complaining. It's starting to cause headaches and they 10 to wreak have a havoc on me. Is there anything else I can do. I tried rice pack,tens unit, massage. The tens unit hurts because my neck is so tense. It goes from shoulder blade to shoulder blade up to my neck. ----- Message -----       Loral Kocher Risser, MD       Sent:9/16/2020  7:56 AM EDT         To:Estela Neff    Subject:RE: Prescription Question    Dear Ms. Brandt Shelton,    Prescription strength ibuprofen was sent to your pharmacy on file. Please let me know if you do not have relief. Please let me know if I can be of further assistance. Have a great day!     Sincerely,    Kristi Mendez MD   [facility] [address] [phone]        ----- Message -----       From:Estela Neff       Sent:9/16/2020  7:51 AM EDT         Julita Koch MD    Subject:Prescription Question    Good morning, my arthritis in my neck is starting up again. I know before NP Shantel Webb use to give me prescription strength ibuprofen. It helped some. I was wondering if there was anyway I could be prescribed that or something else for my arthritis. With the past few colder nights it has reminded me that my arthritis is still there.

## 2020-10-21 ENCOUNTER — VIRTUAL VISIT (OUTPATIENT)
Dept: FAMILY MEDICINE CLINIC | Age: 35
End: 2020-10-21
Payer: MEDICAID

## 2020-10-21 DIAGNOSIS — R41.840 DIFFICULTY CONCENTRATING: Primary | ICD-10-CM

## 2020-10-21 PROCEDURE — 99213 OFFICE O/P EST LOW 20 MIN: CPT | Performed by: FAMILY MEDICINE

## 2020-10-21 NOTE — PROGRESS NOTES
Chief Complaint   Patient presents with    Medication Evaluation    Hyperactivity     Health Maintenance reviewed     1. Have you been to the ER, urgent care clinic since your last visit? Hospitalized since your last visit? No     2. Have you seen or consulted any other health care providers outside of the [address] [place] [address] since your last visit? Include any pap smears or colon screening.   No

## 2020-10-21 NOTE — PROGRESS NOTES
Chief Complaint   Patient presents with    Medication Evaluation    Hyperactivity     Pt reports that she went to a chiropractor for her upper back pain and headaches, reports that almost all of her symptoms have been resolved. Pt goes twice a week for the past month. Pt now reports problems with focus. Pt reports that she has discussed this with her Ob-Gyn and her chiropractor. Pt reports that she jumps from task to task, gets distracted very easily. Pt was advised to consider ADHD as a possible diagnosis. Deepa Lutz is a 38 y.o. female who was seen by synchronous (real-time) audio-video technology on 10/21/2020 for Medication Evaluation and Hyperactivity        Assessment & Plan:   Diagnoses and all orders for this visit:    1. Difficulty concentrating  -     REFERRAL TO NEUROLOGY    Pt has never been previously diagnosed with ADHD or ADD, referred to Dr. Isaiah Davison for formal evaluation and testing. Concerned that there could be multiple factors affecting concentration. Will follow up after testing is complete. Subjective:       Prior to Admission medications    Medication Sig Start Date End Date Taking? Authorizing Provider   cyclobenzaprine (FLEXERIL) 10 mg tablet Take 1 Tab by mouth two (2) times daily as needed for Muscle Spasm(s). 9/22/20  Yes Madison Malhotra MD   ibuprofen (MOTRIN) 800 mg tablet Take 1 Tab by mouth every eight (8) hours as needed for Pain. 9/16/20  Yes Madison Malhotra MD   Stevan Constantin 50 mg tablet TAKE 1 TABLET BY MOUTH ONCE DAILY AS NEEDED MIGRAINE  FOR  UP  TO  1  DOSE 9/8/20  Yes Madison Malhotra MD   fluticasone propionate (FLONASE) 50 mcg/actuation nasal spray 2 Sprays by Both Nostrils route daily. 8/29/20  Yes Madison Malhotra MD   montelukast (SINGULAIR) 10 mg tablet Take 1 Tab by mouth daily. 8/29/20  Yes Madison Malhotra MD   phentermine (ADIPEX-P) 37.5 mg tablet Take 1 Tab by mouth every morning.  Max Daily Amount: 37.5 mg. 5/7/20  Yes Yenny Malhotra MD   fluticasone propionate (FLONASE) 50 mcg/actuation nasal spray 2 Sprays by Both Nostrils route daily. 8/29/20 10/21/20  Yenny Malhotra MD   butalbital-acetaminophen-caffeine (FIORICET, ESGIC) -40 mg per tablet TAKE 1 TABLET BY MOUTH EVERY 6 HOURS AS NEEDED FOR HEADACHE 7/13/20   Yenny Malhotra MD     Patient Active Problem List   Diagnosis Code    Overweight E66.3    Postoperative vaginal bleeding [id number]    Endometriosis N80.9    Depression F32.9       Review of Systems   Constitutional: Negative for chills, fever and malaise/fatigue. HENT: Negative for congestion and sore throat. Respiratory: Negative for cough and shortness of breath. Cardiovascular: Negative for chest pain and palpitations. Gastrointestinal: Negative for abdominal pain, heartburn, nausea and vomiting. Genitourinary: Negative for dysuria and urgency. Musculoskeletal: Negative for joint pain and myalgias. Neurological: Negative for dizziness, tingling and headaches. All other systems reviewed and are negative.       Objective:     Patient-Reported Vitals 10/21/2020   Patient-Reported Weight 137   Patient-Reported Height 54   Patient-Reported Pulse 50   Patient-Reported Temperature 98.6   Patient-Reported SpO2 0   Patient-Reported Systolic  796   Patient-Reported Diastolic 60        [INSTRUCTIONS:  \"[x]\" Indicates a positive item  \"[]\" Indicates a negative item  -- DELETE ALL ITEMS NOT EXAMINED]    Constitutional: [x] Appears well-developed and well-nourished [x] No apparent distress      [] Abnormal -     Mental status: [x] Alert and awake  [x] Oriented to person/place/time [x] Able to follow commands    [] Abnormal -     Eyes:   EOM    [x]  Normal    [] Abnormal -   Sclera  [x]  Normal    [] Abnormal -          Discharge [x]  None visible   [] Abnormal -     HENT: [x] Normocephalic, atraumatic  [] Abnormal -   [x] Mouth/Throat: Mucous membranes are moist    External Ears [x] Normal  [] Abnormal -    Neck: [x] No visualized mass [] Abnormal -     Pulmonary/Chest: [x] Respiratory effort normal   [x] No visualized signs of difficulty breathing or respiratory distress        [] Abnormal -      Musculoskeletal:   [x] Normal gait with no signs of ataxia         [x] Normal range of motion of neck        [] Abnormal -     Neurological:        [x] No Facial Asymmetry (Cranial nerve 7 motor function) (limited exam due to video visit)          [x] No gaze palsy        [] Abnormal -          Skin:        [x] No significant exanthematous lesions or discoloration noted on facial skin         [] Abnormal -            Psychiatric:       [x] Normal Affect [] Abnormal -        [x] No Hallucinations    Other pertinent observable physical exam findings:-        We discussed the expected course, resolution and complications of the diagnosis(es) in detail. Medication risks, benefits, costs, interactions, and alternatives were discussed as indicated. I advised her to contact the office if her condition worsens, changes or fails to improve as anticipated. She expressed understanding with the diagnosis(es) and plan. Morales Bob, who was evaluated through a patient-initiated, synchronous (real-time) audio-video encounter, and/or her healthcare decision maker, is aware that it is a billable service, with coverage as determined by her insurance carrier. She provided verbal consent to proceed: Yes, and patient identification was verified. It was conducted pursuant to the emergency declaration under the [facility], [address] [place] [address] authority and the [employer] and [employer] Act. A caregiver was present when appropriate. Ability to conduct physical exam was limited. I was at home. The patient was at home.       Molly Mares MD

## 2020-11-15 DIAGNOSIS — G43.701 CHRONIC MIGRAINE WITHOUT AURA WITH STATUS MIGRAINOSUS, NOT INTRACTABLE: ICD-10-CM

## 2020-11-16 RX ORDER — UBROGEPANT 50 MG/1
TABLET ORAL
Qty: 12 TAB | Refills: 0 | Status: SHIPPED | OUTPATIENT
Start: 2020-11-16 | End: 2021-01-20 | Stop reason: SDUPTHER

## 2020-11-16 RX ORDER — RIZATRIPTAN BENZOATE 10 MG/1
TABLET ORAL
Qty: 9 TAB | Refills: 0 | Status: SHIPPED | OUTPATIENT
Start: 2020-11-16

## 2021-04-02 DIAGNOSIS — G44.89 ALLERGIC HEADACHE: ICD-10-CM

## 2021-04-02 DIAGNOSIS — Z76.89 ENCOUNTER FOR WEIGHT MANAGEMENT: ICD-10-CM

## 2021-04-02 RX ORDER — PHENTERMINE HYDROCHLORIDE 37.5 MG/1
37.5 TABLET ORAL
Qty: 30 TAB | Refills: 1 | OUTPATIENT
Start: 2021-04-02

## 2021-04-02 RX ORDER — FLUTICASONE PROPIONATE 50 MCG
2 SPRAY, SUSPENSION (ML) NASAL DAILY
Qty: 1 BOTTLE | Refills: 3 | Status: CANCELLED | OUTPATIENT
Start: 2021-04-02

## 2021-04-02 RX ORDER — IBUPROFEN 800 MG/1
800 TABLET ORAL
Qty: 60 TAB | Refills: 1 | Status: CANCELLED | OUTPATIENT
Start: 2021-04-02

## 2021-04-02 RX ORDER — PHENTERMINE HYDROCHLORIDE 37.5 MG/1
37.5 TABLET ORAL
Qty: 30 TAB | Refills: 1 | Status: CANCELLED | OUTPATIENT
Start: 2021-04-02

## 2021-04-02 NOTE — TELEPHONE ENCOUNTER
Requested Prescriptions     Pending Prescriptions Disp Refills    phentermine (ADIPEX-P) 37.5 mg tablet 30 Tab 1     Sig: Take 1 Tab by mouth every morning. Max Daily Amount: 37.5 mg. Patient is wondering if she needs a virtual visit to get the medication refilled.

## 2021-04-08 ENCOUNTER — VIRTUAL VISIT (OUTPATIENT)
Dept: FAMILY MEDICINE CLINIC | Age: 36
End: 2021-04-08
Payer: MEDICAID

## 2021-04-08 DIAGNOSIS — Z76.89 ENCOUNTER FOR WEIGHT MANAGEMENT: ICD-10-CM

## 2021-04-08 PROCEDURE — 99442 PR PHYS/QHP TELEPHONE EVALUATION 11-20 MIN: CPT | Performed by: FAMILY MEDICINE

## 2021-04-08 RX ORDER — PHENTERMINE HYDROCHLORIDE 37.5 MG/1
37.5 TABLET ORAL
Qty: 30 TAB | Refills: 2 | Status: SHIPPED | OUTPATIENT
Start: 2021-04-08 | End: 2021-10-21 | Stop reason: SDUPTHER

## 2021-04-08 NOTE — PROGRESS NOTES
Chief Complaint   Patient presents with    Medication Evaluation     follow-up        Patient was evaluated today via phone call only. Patient reports that she would like to refill her phentermine. Patient has been doing well the medication, came off her several months and noticed increased hunger. Patient will like to resume the medication while she works on controlling factors such as stress and resuming exercise to allow her to get back on track. Patient reports that her current weight is [address] is a 43 y.o. female, evaluated via audio-only technology on 4/8/2021 for Medication Evaluation (follow-up )  . Assessment & Plan:   Diagnoses and all orders for this visit:    1. Encounter for weight management  -     phentermine (ADIPEX-P) 37.5 mg tablet; Take 1 Tab by mouth every morning. Max Daily Amount: 37.5 mg.        12  Subjective:       Prior to Admission medications    Medication Sig Start Date End Date Taking? Authorizing Provider   phentermine (ADIPEX-P) 37.5 mg tablet Take 1 Tab by mouth every morning. Max Daily Amount: 37.5 mg. 4/8/21  Yes Angelia Malhotra MD   fluticasone propionate (FLONASE) 50 mcg/actuation nasal spray 2 Sprays by Both Nostrils route daily. 4/2/21  Yes Angelia Malhotra MD   ibuprofen (MOTRIN) 800 mg tablet Take 1 Tab by mouth every eight (8) hours as needed for Pain. 4/2/21  Yes Angelia Malhotra MD   butalbital-acetaminophen-caffeine (FIORICET, ESGIC) -40 mg per tablet Take 1 Tab by mouth every six (6) hours as needed for Headache. 1/20/21  Yes Angelia Malhotra MD   montelukast (SINGULAIR) 10 mg tablet Take 1 Tab by mouth daily.  1/20/21  Yes Angelia Malhotra MD   rizatriptan (MAXALT) 10 mg tablet TAKE 1 TABLET BY MOUTH AS NEEDED FOR MIGRAINE HEADACHE UP TO 1 DOSE. MAY  REPEAT  IN  2  HOURS  IF  NEEDED 11/16/20  Yes Angelia Malhotra MD   cyclobenzaprine (FLEXERIL) 10 mg tablet Take 1 Tab by mouth two (2) times daily as needed for Muscle Spasm(s). 9/22/20  Yes Juno Malhotra MD     Patient Active Problem List   Diagnosis Code    Overweight E66.3    Postoperative vaginal bleeding [id number]    Endometriosis N80.9    Depression F32.9     Current Outpatient Medications   Medication Sig Dispense Refill    phentermine (ADIPEX-P) 37.5 mg tablet Take 1 Tab by mouth every morning. Max Daily Amount: 37.5 mg. 30 Tab 2    fluticasone propionate (FLONASE) 50 mcg/actuation nasal spray 2 Sprays by Both Nostrils route daily. 1 Bottle 3    ibuprofen (MOTRIN) 800 mg tablet Take 1 Tab by mouth every eight (8) hours as needed for Pain. 60 Tab 1    butalbital-acetaminophen-caffeine (FIORICET, ESGIC) -40 mg per tablet Take 1 Tab by mouth every six (6) hours as needed for Headache. 30 Tab 0    montelukast (SINGULAIR) 10 mg tablet Take 1 Tab by mouth daily. 30 Tab 5    rizatriptan (MAXALT) 10 mg tablet TAKE 1 TABLET BY MOUTH AS NEEDED FOR MIGRAINE HEADACHE UP TO 1 DOSE. MAY  REPEAT  IN  2  HOURS  IF  NEEDED 9 Tab 0    cyclobenzaprine (FLEXERIL) 10 mg tablet Take 1 Tab by mouth two (2) times daily as needed for Muscle Spasm(s). 30 Tab 0     Allergies   Allergen Reactions    Imitrex [Sumatriptan Succinate] Other (comments)     Made patient feel funny       Review of Systems   Constitutional: Negative for chills, fever and malaise/fatigue. HENT: Negative for congestion and sore throat. Respiratory: Negative for cough and shortness of breath. Cardiovascular: Negative for chest pain and palpitations. Gastrointestinal: Negative for abdominal pain, heartburn, nausea and vomiting. Genitourinary: Negative for dysuria and urgency. Musculoskeletal: Negative for joint pain and myalgias. Neurological: Negative for dizziness, tingling and headaches. All other systems reviewed and are negative.       Patient-Reported Vitals 4/8/2021   Patient-Reported Weight 157 lbs   Patient-Reported Height -   Patient-Reported Pulse -   Patient-Reported Temperature -   Patient-Reported SpO2 -   Patient-Reported Systolic  -   Patient-Reported Diastolic -        Ova Scar, who was evaluated through a patient-initiated, synchronous (real-time) audio only encounter, and/or her healthcare decision maker, is aware that it is a billable service, with coverage as determined by her insurance carrier. She provided verbal consent to proceed: Yes. She has not had a related appointment within my department in the past 7 days or scheduled within the next 24 hours.       Total Time: minutes: 11-20 minutes    Vignesh Stevens MD

## 2021-04-08 NOTE — PROGRESS NOTES
Chief Complaint   Patient presents with    Medication Evaluation     follow-up      Health Maintenance reviewed       1. Have you been to the ER, urgent care clinic since your last visit? Hospitalized since your last visit? No     2. Have you seen or consulted any other health care providers outside of the [address] [place] [address] since your last visit? Include any pap smears or colon screening.   No

## 2021-08-19 ENCOUNTER — TELEPHONE (OUTPATIENT)
Dept: FAMILY MEDICINE CLINIC | Age: 36
End: 2021-08-19

## 2021-08-19 NOTE — TELEPHONE ENCOUNTER
Patient called to check on medication refill, pt is asking why medication is not being refilled.     Please give patient a call back  BCB# [phone]

## 2021-10-21 ENCOUNTER — VIRTUAL VISIT (OUTPATIENT)
Dept: FAMILY MEDICINE CLINIC | Age: 36
End: 2021-10-21
Payer: MEDICAID

## 2021-10-21 DIAGNOSIS — Z76.89 ENCOUNTER FOR WEIGHT MANAGEMENT: ICD-10-CM

## 2021-10-21 DIAGNOSIS — R53.83 FATIGUE, UNSPECIFIED TYPE: ICD-10-CM

## 2021-10-21 DIAGNOSIS — E66.3 OVERWEIGHT: ICD-10-CM

## 2021-10-21 DIAGNOSIS — G43.909 MIGRAINE WITHOUT STATUS MIGRAINOSUS, NOT INTRACTABLE, UNSPECIFIED MIGRAINE TYPE: Primary | ICD-10-CM

## 2021-10-21 PROCEDURE — 99214 OFFICE O/P EST MOD 30 MIN: CPT | Performed by: FAMILY MEDICINE

## 2021-10-21 RX ORDER — IBUPROFEN 800 MG/1
800 TABLET ORAL
Qty: 60 TABLET | Refills: 1 | Status: SHIPPED | OUTPATIENT
Start: 2021-10-21 | End: 2022-03-21 | Stop reason: SDUPTHER

## 2021-10-21 RX ORDER — PHENTERMINE HYDROCHLORIDE 37.5 MG/1
37.5 TABLET ORAL
Qty: 30 TABLET | Refills: 2 | Status: CANCELLED | OUTPATIENT
Start: 2021-10-21

## 2021-10-21 RX ORDER — UBROGEPANT 50 MG/1
50 TABLET ORAL AS NEEDED
COMMUNITY
Start: 2021-08-03 | End: 2021-10-21 | Stop reason: SDUPTHER

## 2021-10-21 RX ORDER — PHENTERMINE HYDROCHLORIDE 37.5 MG/1
37.5 TABLET ORAL
Qty: 30 TABLET | Refills: 2 | Status: SHIPPED | OUTPATIENT
Start: 2021-10-21 | End: 2022-03-21

## 2021-10-21 RX ORDER — UBROGEPANT 100 MG/1
TABLET ORAL
COMMUNITY
Start: 2021-08-02 | End: 2021-09-01

## 2021-10-21 NOTE — PROGRESS NOTES
1. Have you been to the ER, urgent care clinic since your last visit? Hospitalized since your last visit? No    2. Have you seen or consulted any other health care providers outside of the [address] [place] [address] since your last visit? Include any pap smears or colon screening.  No    Health Maintenance Due   Topic Date Due    Hepatitis C Screening  Never done    COVID-19 Vaccine (1) Never done    Cervical cancer screen  Never done    Flu Vaccine (1) 09/01/2021

## 2021-10-21 NOTE — PROGRESS NOTES
THIS VISIT WAS COMPLETED VIRTUALLY USING DOXY. SOPHY AVILA  López Ramos is a 43 y.o. female who presents to follow-up on migraine and weight management. Primary purpose of the visit is she would like a refill on her phentermine. She finds this medication beneficial to help maintain healthy weight and to help with energy levels. She finds it taking phentermine 37 mg daily is too much. It makes her insides jiggly. She takes half a tab every other day and finds this useful. She tried to wean herself off of phentermine. And succeeded for the last 1.5 months but after about 3 weeks off the phentermine noticed that her energy level was declining and then a week later felt like she needed to be fueled by calories so she started to gain weight. Her weight started out at 145 pounds and has risen to 151 pounds in the last 2 weeks. We discussed other possible sources of her fatigue. She does not get good sleep. On days where she is not working she will go to bed at 9 and wake up at 6. On days where she is working she will go to bed at 1130 and wake up at 6. Regardless of how much time she defends for sleep she will wake up feeling equally exhausted. She has 2 go to the bathroom twice per night and spends a substantial amount of time tossing and turning and thinking about things. She has tried a number of medications to help with sleep. She has tried melatonin and other OTC sleep aids. She has tried amitriptyline and felt overmedicated the next day. She has tried her migraine medication and felt overmedicated the next day. PMHx:  Past Medical History:   Diagnosis Date    Depression 11/28/2016    Kidney stone     Ovarian cyst     Ovarian cyst, left     UTI (urinary tract infection)        Meds:   Current Outpatient Medications   Medication Sig Dispense Refill    ubrogepant (Ubrelvy) 50 mg tablet Take 1 Tablet by mouth as needed for Migraine.  12 Tablet 3    phentermine (ADIPEX-P) 37.5 mg tablet Take 1 Tablet by mouth every morning. Max Daily Amount: 37.5 mg. 30 Tablet 2    fluticasone propionate (FLONASE) 50 mcg/actuation nasal spray 2 Sprays by Both Nostrils route daily. 1 Bottle 3    ibuprofen (MOTRIN) 800 mg tablet Take 1 Tablet by mouth every eight (8) hours as needed for Pain. 60 Tablet 1    butalbital-acetaminophen-caffeine (FIORICET, ESGIC) -40 mg per tablet Take 1 Tab by mouth every six (6) hours as needed for Headache. 30 Tab 0    montelukast (SINGULAIR) 10 mg tablet Take 1 Tab by mouth daily. 30 Tab 5    rizatriptan (MAXALT) 10 mg tablet TAKE 1 TABLET BY MOUTH AS NEEDED FOR MIGRAINE HEADACHE UP TO 1 DOSE. MAY  REPEAT  IN  2  HOURS  IF  NEEDED 9 Tab 0    cyclobenzaprine (FLEXERIL) 10 mg tablet Take 1 Tab by mouth two (2) times daily as needed for Muscle Spasm(s). 30 Tab 0       Allergies: Allergies   Allergen Reactions    Imitrex [Sumatriptan Succinate] Other (comments)     Made patient feel funny       Smoker:  Social History     Tobacco Use   Smoking Status Never Smoker   Smokeless Tobacco Never Used       ETOH:   Social History     Substance and Sexual Activity   Alcohol Use Yes    Comment: socially       FH:   Family History   Problem Relation Age of Onset    Cancer Mother     Asthma Maternal Aunt        ROS:   As listed in HPI. In addition:  Constitutional:   No headache, fever, fatigue, weight loss or weight gain      Cardiac:    No chest pain      Resp:   No cough or shortness of breath      Neuro   No loss of consciousness, dizziness, seizures      Physical Exam:  There were no vitals taken for this visit. GEN: No apparent distress. Alert and oriented and responds to all questions appropriately. NEUROLOGIC:  No focal neurologic deficits. Coordination and gait grossly intact. EXT: Well perfused. No edema. SKIN: No obvious rashes. Due to this being a TeleHealth evaluation, many elements of the physical examination are unable to be assessed.         Assessment and Plan       Fatigue  Identifies this as her primary issue. Seems to gain weight by trying to feel herself with calories. Does not get a satisfying night sleep  I suggested metabolic work-up as that has not been done since 2017    Offered to revisit sleep aids. She feels like she has already tried this    Weight management  Phentermine is helpful and reasonable for this. Discussed that we should be trying to find an off ramp from this medication  Reoriented that she should not be taking phentermine for energy  Congratulated her on successfully weaning off the medication and staying off of that for 1.5 months. However she would like to retry this medicine as a medicine that she has found helpful and effective. She is taking phentermine half a tab every other day    Migraine  Is using Ubrelvy twice a week with good relief    She has tried and failed Prozac, Cymbalta, amitriptyline due to side effects      ICD-10-CM ICD-9-CM    1. Migraine without status migrainosus, not intractable, unspecified migraine type  G43.909 346.90 ubrogepant (Ubrelvy) 50 mg tablet   2. Encounter for weight management  Z76.89 V65.49 phentermine (ADIPEX-P) 37.5 mg tablet   3. Fatigue, unspecified type  R53.83 780.79 CBC WITH AUTOMATED DIFF      LIPID PANEL      METABOLIC PANEL, COMPREHENSIVE      TSH 3RD GENERATION      HEMOGLOBIN A1C WITH EAG      CBC WITH AUTOMATED DIFF      LIPID PANEL      METABOLIC PANEL, COMPREHENSIVE      TSH 3RD GENERATION      HEMOGLOBIN A1C WITH EAG   4. Overweight  E66.3 278.02          Pursuant to the emergency declaration under the [facility], [facility] waiver authority and the [employer] and [employer] Act, this Virtual  Visit was conducted, with patient's consent, to reduce the patient's risk of exposure to COVID-19 and provide continuity of care for an established patient.      Services were provided through a video synchronous discussion virtually to substitute for in-person clinic visit.

## 2022-03-19 DIAGNOSIS — Z76.89 ENCOUNTER FOR WEIGHT MANAGEMENT: ICD-10-CM

## 2022-03-21 RX ORDER — PHENTERMINE HYDROCHLORIDE 37.5 MG/1
37.5 TABLET ORAL
Qty: 30 TABLET | Refills: 1 | Status: SHIPPED | OUTPATIENT
Start: 2022-03-21 | End: 2022-08-22

## 2022-05-20 ENCOUNTER — VIRTUAL VISIT (OUTPATIENT)
Dept: FAMILY MEDICINE CLINIC | Age: 37
End: 2022-05-20
Payer: MEDICAID

## 2022-05-20 DIAGNOSIS — G44.89 ALLERGIC HEADACHE: ICD-10-CM

## 2022-05-20 DIAGNOSIS — J30.1 ALLERGIC RHINITIS DUE TO POLLEN, UNSPECIFIED SEASONALITY: Primary | ICD-10-CM

## 2022-05-20 PROCEDURE — 99213 OFFICE O/P EST LOW 20 MIN: CPT | Performed by: FAMILY MEDICINE

## 2022-05-20 RX ORDER — MONTELUKAST SODIUM 10 MG/1
10 TABLET ORAL DAILY
Qty: 90 TABLET | Refills: 3 | Status: SHIPPED | OUTPATIENT
Start: 2022-05-20

## 2022-05-20 RX ORDER — PREDNISONE 20 MG/1
40 TABLET ORAL
Qty: 14 TABLET | Refills: 0 | Status: SHIPPED | OUTPATIENT
Start: 2022-05-20 | End: 2022-05-27

## 2022-05-20 RX ORDER — BENZONATATE 200 MG/1
200 CAPSULE ORAL
Qty: 21 CAPSULE | Refills: 1 | Status: SHIPPED | OUTPATIENT
Start: 2022-05-20 | End: 2022-05-27

## 2022-05-20 RX ORDER — FLUTICASONE PROPIONATE 50 MCG
2 SPRAY, SUSPENSION (ML) NASAL DAILY
Qty: 1 EACH | Refills: 5 | Status: SHIPPED | OUTPATIENT
Start: 2022-05-20

## 2022-05-20 NOTE — PROGRESS NOTES
1. Have you been to the ER, urgent care clinic since your last visit? Hospitalized since your last visit? No    2. Have you seen or consulted any other health care providers outside of the [address] [place] [address] since your last visit? Include any pap smears or colon screening. No    Health Maintenance Due   Topic Date Due    Hepatitis C Screening  Never done    COVID-19 Vaccine (1) Never done    Cervical cancer screen  Never done     Chief Complaint   Patient presents with    Allergies     sneezing, coughing, nasal drainage      Pt states she has been taking Zyrtec to help with symptoms but it hasn't worked. Pt also states had recently taken her daughter to the ER for related symptoms (coughing) and it turned into Bronchitis. Pt states she has taken an at home covid test, which resulted Negative.

## 2022-05-20 NOTE — LETTER
NOTIFICATION RETURN TO WORK / SCHOOL    5/20/2022 12:15 PM    Ms. Angelina Vasquez  [address] [place] [address]      To Whom It May Concern:    Angelina Vasquez is currently under the care of [address] [place] [address]. Please excuse from work for illness 5/20-5/21. Should be okay to return to work if feeling better after that    If there are questions or concerns please have the patient contact our office.         Sincerely,      Ryan Loza MD

## 2022-05-20 NOTE — PROGRESS NOTES
THIS VISIT WAS COMPLETED VIRTUALLY USING DOXY. ME    [place]IN  Antoinette Sánchez is a 42 y.o. female who presents with cough congestion itchy eyes, scratchy throat. Cough is optically bad at night. Cough is affecting her work as a  because people are looking or funny. She is wearing a mask washing her hands. This been going on for a few months. This is her allergy season. Usually takes Zyrtec and Singulair together but stopped taking the Singulair because she felt like Zyrtec was doing a good job about a month ago. In retrospect this is when she started feeling worse. Took a home COVID test, it was negative    Continue her Flonase and her nose feels fine    PMHx:  Past Medical History:   Diagnosis Date    Depression 11/28/2016    Kidney stone     Ovarian cyst     Ovarian cyst, left     UTI (urinary tract infection)        Meds:   Current Outpatient Medications   Medication Sig Dispense Refill    montelukast (SINGULAIR) 10 mg tablet Take 1 Tablet by mouth daily. 90 Tablet 3    predniSONE (DELTASONE) 20 mg tablet Take 2 Tablets by mouth daily (with breakfast) for 7 days. 14 Tablet 0    fluticasone propionate (FLONASE) 50 mcg/actuation nasal spray 2 Sprays by Both Nostrils route daily. 1 Each 5    benzonatate (TESSALON) 200 mg capsule Take 1 Capsule by mouth three (3) times daily as needed for Cough for up to 7 days. 21 Capsule 1    phentermine (ADIPEX-P) 37.5 mg tablet TAKE 1 TABLET BY MOUTH EVERY MORNING. MAX DAILY AMOUNT: 37.5 MG. 30 Tablet 1    ibuprofen (MOTRIN) 800 mg tablet Take 1 Tablet by mouth every eight (8) hours as needed for Pain. 60 Tablet 1    ubrogepant (Ubrelvy) 50 mg tablet Take 1 Tablet by mouth as needed for Migraine. 12 Tablet 3    butalbital-acetaminophen-caffeine (FIORICET, ESGIC) -40 mg per tablet Take 1 Tab by mouth every six (6) hours as needed for Headache.  30 Tab 0    rizatriptan (MAXALT) 10 mg tablet TAKE 1 TABLET BY MOUTH AS NEEDED FOR MIGRAINE HEADACHE UP TO 1 DOSE. MAY  REPEAT  IN  2  HOURS  IF  NEEDED 9 Tab 0    cyclobenzaprine (FLEXERIL) 10 mg tablet Take 1 Tab by mouth two (2) times daily as needed for Muscle Spasm(s). 30 Tab 0       Allergies: Allergies   Allergen Reactions    Imitrex [Sumatriptan Succinate] Other (comments)     Made patient feel funny       Smoker:  Social History     Tobacco Use   Smoking Status Never Smoker   Smokeless Tobacco Never Used       ETOH:   Social History     Substance and Sexual Activity   Alcohol Use Yes    Comment: socially       FH:   Family History   Problem Relation Age of Onset    Cancer Mother     Asthma Maternal Aunt        ROS:   As listed in HPI. In addition:  Constitutional:   No headache, fever, fatigue, weight loss or weight gain      Cardiac:    No chest pain      Resp:   No cough or shortness of breath      Neuro   No loss of consciousness, dizziness, seizures      Physical Exam:  There were no vitals taken for this visit. GEN: No apparent distress. Alert and oriented and responds to all questions appropriately. NEUROLOGIC:  No focal neurologic deficits. Coordination and gait grossly intact. EXT: Well perfused. No edema. SKIN: No obvious rashes. Due to this being a TeleHealth evaluation, many elements of the physical examination are unable to be assessed. Assessment and Plan     Allergic rhinitis  No recent worsening. I suspect this is a continuation of her allergies rather than a recently acquired viral illness. Even so I recommend she continue wearing mask washing her hands and using all of the usual pandemic precautions because viral illnesses can look very similar to allergies so she may not be able to tell the difference. Restart Zyrtec every day  Restart Singulair every day  Continue your Flonase every day  Having a lot of trouble sleeping so we discussed prednisone as a extreme option if she is feeling miserable.   She will keep this pill in pocket    Tessalon for cough  Honey and warm water for cough      ICD-10-CM ICD-9-CM    1. Allergic rhinitis due to pollen, unspecified seasonality  J30.1 477.0    2. Allergic headache  G44.89 339.00 montelukast (SINGULAIR) 10 mg tablet      predniSONE (DELTASONE) 20 mg tablet      fluticasone propionate (FLONASE) 50 mcg/actuation nasal spray      benzonatate (TESSALON) 200 mg capsule         Pursuant to the emergency declaration under the [address] [place] [address] waiver authority and the [employer] and [employer] Act, this Virtual  Visit was conducted, with patient's consent, to reduce the patient's risk of exposure to COVID-19 and provide continuity of care for an established patient. Services were provided through a video synchronous discussion virtually to substitute for in-person clinic visit.

## 2022-07-14 ENCOUNTER — VIRTUAL VISIT (OUTPATIENT)
Dept: FAMILY MEDICINE CLINIC | Age: 37
End: 2022-07-14
Payer: MEDICAID

## 2022-07-14 DIAGNOSIS — F41.1 GAD (GENERALIZED ANXIETY DISORDER): ICD-10-CM

## 2022-07-14 DIAGNOSIS — E53.8 B12 DEFICIENCY: ICD-10-CM

## 2022-07-14 DIAGNOSIS — E55.9 VITAMIN D DEFICIENCY: ICD-10-CM

## 2022-07-14 DIAGNOSIS — E61.1 IRON DEFICIENCY: ICD-10-CM

## 2022-07-14 DIAGNOSIS — G25.81 RLS (RESTLESS LEGS SYNDROME): ICD-10-CM

## 2022-07-14 DIAGNOSIS — G43.909 MIGRAINE WITHOUT STATUS MIGRAINOSUS, NOT INTRACTABLE, UNSPECIFIED MIGRAINE TYPE: Primary | ICD-10-CM

## 2022-07-14 DIAGNOSIS — R53.83 FATIGUE, UNSPECIFIED TYPE: ICD-10-CM

## 2022-07-14 DIAGNOSIS — N92.6 IRREGULAR MENSES: ICD-10-CM

## 2022-07-14 PROCEDURE — 99214 OFFICE O/P EST MOD 30 MIN: CPT | Performed by: FAMILY MEDICINE

## 2022-07-14 RX ORDER — FLUOXETINE HYDROCHLORIDE 20 MG/1
20 CAPSULE ORAL DAILY
Qty: 90 CAPSULE | Refills: 3 | Status: SHIPPED
Start: 2022-07-14 | End: 2022-08-05 | Stop reason: DRUGHIGH

## 2022-07-14 RX ORDER — TRIAMCINOLONE ACETONIDE 1 MG/G
CREAM TOPICAL 2 TIMES DAILY
Qty: 80 G | Refills: 1 | Status: SHIPPED | OUTPATIENT
Start: 2022-07-14

## 2022-07-14 NOTE — PROGRESS NOTES
THIS VISIT WAS COMPLETED VIRTUALLY USING [employer]. SOPHY AVILA  Scotty Mar is a 42 y.o. female who presents with several issues. Complains of of restless legs. Describes this as involuntary leg movements in her sleep that will wake her and her  up. This has been going on for years but has become worse in the last 3 months. She has a rash that she recognizes as a \"stress rash\". She was under a lot of stress 3 weeks ago at her old job and ultimately culminated in her quitting 3 weeks ago. The rash got worse until the day she quit and that has lingered since then. She has tried OTC hydrocortisone with some relief of the itching. Has tried hydrogen peroxide which burns and did not improve things. She has been scratching at it. She has found that her eye is twitching and will occasionally \"jerked my jaw\". She feels that this is correlating with her stress as well. Gets self-conscious about this but when asking of the people nobody else notices it is happening. Denies pain in the TMJ. Denies jaw clenching. 3 PM she feels drained. This has been going on for a long time. If she goes home and sleeps and allows herself to sleep at 3 PM she can easily sleep through the night until she is woken up by her legs. This is a long-term problem. She has no sex drive. This has been going on for years. Her menstrual cycle is irregular. Has at least 1 bleeding event each month but it could be more frequent than that. Describes a situation where she might have 3 episodes of bleeding in a month. She will have a full flow 3-4-day flow, 4-day break, 3-4 days of full flow, 4-day break, 3-4 days of flow. Has been going on since age 10 without much difference. Brought this to the attention of her gynecologist in 2020. Basic hormone labs and tried on progestin this gave her side effects so she stopped it    Endorses anxiety at home and at work. Little things are getting to her.   Snaps with little provocation    PMH differencex:  Past Medical History:   Diagnosis Date    Depression 11/28/2016    Kidney stone     Ovarian cyst     Ovarian cyst, left     UTI (urinary tract infection)        Meds:   Current Outpatient Medications   Medication Sig Dispense Refill    montelukast (SINGULAIR) 10 mg tablet Take 1 Tablet by mouth daily. 90 Tablet 3    fluticasone propionate (FLONASE) 50 mcg/actuation nasal spray 2 Sprays by Both Nostrils route daily. 1 Each 5    phentermine (ADIPEX-P) 37.5 mg tablet TAKE 1 TABLET BY MOUTH EVERY MORNING. MAX DAILY AMOUNT: 37.5 MG. 30 Tablet 1    ibuprofen (MOTRIN) 800 mg tablet Take 1 Tablet by mouth every eight (8) hours as needed for Pain. 60 Tablet 1    ubrogepant (Ubrelvy) 50 mg tablet Take 1 Tablet by mouth as needed for Migraine. 12 Tablet 3    butalbital-acetaminophen-caffeine (FIORICET, ESGIC) -40 mg per tablet Take 1 Tab by mouth every six (6) hours as needed for Headache. 30 Tab 0    rizatriptan (MAXALT) 10 mg tablet TAKE 1 TABLET BY MOUTH AS NEEDED FOR MIGRAINE HEADACHE UP TO 1 DOSE. MAY  REPEAT  IN  2  HOURS  IF  NEEDED 9 Tab 0    cyclobenzaprine (FLEXERIL) 10 mg tablet Take 1 Tab by mouth two (2) times daily as needed for Muscle Spasm(s). 30 Tab 0       Allergies: Allergies   Allergen Reactions    Imitrex [Sumatriptan Succinate] Other (comments)     Made patient feel funny       Smoker:  Social History     Tobacco Use   Smoking Status Never Smoker   Smokeless Tobacco Never Used       ETOH:   Social History     Substance and Sexual Activity   Alcohol Use Yes    Comment: socially       FH:   Family History   Problem Relation Age of Onset    Cancer Mother     Asthma Maternal Aunt        ROS:   As listed in HPI.  In addition:  Constitutional:   No headache, fever, fatigue, weight loss or weight gain      Cardiac:    No chest pain      Resp:   No cough or shortness of breath      Neuro   No loss of consciousness, dizziness, seizures      Physical Exam:  There were no vitals taken for this visit. GEN: No apparent distress. Alert and oriented and responds to all questions appropriately. NEUROLOGIC:  No focal neurologic deficits. Coordination and gait grossly intact. EXT: Well perfused. No edema. SKIN: No obvious rashes. Due to this being a TeleHealth evaluation, many elements of the physical examination are unable to be assessed. Assessment and Plan     CARSON  She feels like her anxiety is getting to her. She would like to consider medication. Is tried Prozac and Cymbalta in the past cannot recall how beneficial these were but also cannot recall any downsides. They were tried prepandemic. Looks like she stopped the Cymbalta in 2020. She would like to try Prozac because of the weight neutrality  Start Prozac 20 mg  Follow-up in 4 weeks for dose assessment/adjustment. Irregular menses, fatigue, lack of sex drive  Consider endocrine problem. I suggested endocrinology referral to help with this chronic issue. Abnormal nocturnal leg movements  Has never had a sleep study  Check for anemia and iron deficiency, basic labs  Low threshold for referral to sleep study. Likely related to her fatigue  Possibly related to sex drive    Rash  Pruritic  Steroid cream  Zyrtec    Eye twitch is a stress response    Some basic labs to [facility]  Follow-up in 4 weeks  Low threshold for sleep study referral based on results        ICD-10-CM ICD-9-CM    1. Migraine without status migrainosus, not intractable, unspecified migraine type  G43.909 346.90    2. Fatigue, unspecified type  R53.83 780.79 LIPID PANEL      CBC WITH AUTOMATED DIFF      METABOLIC PANEL, COMPREHENSIVE      TSH 3RD GENERATION      LIPID PANEL      CBC WITH AUTOMATED DIFF      METABOLIC PANEL, COMPREHENSIVE      TSH 3RD GENERATION   3. RLS (restless legs syndrome)  G25.81 333.94    4. CARSON (generalized anxiety disorder)  F41.1 300.02    5. Irregular menses  N92.6 626.4    6.  Iron deficiency  E61.1 280.9 IRON PROFILE      FERRITIN      IRON PROFILE      FERRITIN   7. B12 deficiency  E53.8 266.2 VITAMIN B12 & FOLATE      VITAMIN B12 & FOLATE   8. Vitamin D deficiency  E55.9 268.9 VITAMIN D, 25 HYDROXY      VITAMIN D, 25 HYDROXY         Pursuant to the emergency declaration under the [facility], [facility] waiver authority and the [employer] and [employer] Act, this Virtual  Visit was conducted, with patient's consent, to reduce the patient's risk of exposure to COVID-19 and provide continuity of care for an established patient. Services were provided through a video synchronous discussion virtually to substitute for in-person clinic visit.

## 2022-07-14 NOTE — PROGRESS NOTES
Health Maintenance Due   Topic Date Due    Hepatitis C Screening  Never done    COVID-19 Vaccine (1) Never done    Cervical cancer screen  Never done     1. \"Have you been to the ER, urgent care clinic since your last visit? Hospitalized since your last visit? \" No    2. \"Have you seen or consulted any other health care providers outside of the [address] [place] [address] since your last visit? \" No     3. For patients aged 39-70: Has the patient had a colonoscopy / FIT/ Cologuard? NA - based on age      If the patient is female:    4. For patients aged 41-77: Has the patient had a mammogram within the past 2 years? NA - based on age or sex      11. For patients aged 21-65: Has the patient had a pap smear?  No

## 2022-08-05 ENCOUNTER — TELEPHONE (OUTPATIENT)
Dept: FAMILY MEDICINE CLINIC | Age: 37
End: 2022-08-05

## 2022-08-05 RX ORDER — DULOXETIN HYDROCHLORIDE 20 MG/1
20 CAPSULE, DELAYED RELEASE ORAL DAILY
Qty: 90 CAPSULE | Refills: 3 | Status: SHIPPED | OUTPATIENT
Start: 2022-08-05

## 2022-08-05 NOTE — TELEPHONE ENCOUNTER
----- Message from [address] sent at 8/5/2022 10:56 AM EDT -----  Subject: Medication Problem    Medication: FLUoxetine (PROzac) 20 mg capsule  Dosage: 1cap daily  Ordering Provider: ricci    Question/Problem: Most times patient cannot keep the medication down, if   she is able to she is nauseated or feels like a complete zombie requesting   something different if possible    Pharmacy: [facility] [address]    ---------------------------------------------------------------------------  --------------  [employer]  [phone]; OK to leave message on voicemail  ---------------------------------------------------------------------------  --------------    SCRIPT ANSWERS  Relationship to Patient: Self

## 2022-08-20 DIAGNOSIS — Z76.89 ENCOUNTER FOR WEIGHT MANAGEMENT: ICD-10-CM

## 2022-08-22 RX ORDER — PHENTERMINE HYDROCHLORIDE 37.5 MG/1
37.5 TABLET ORAL
Qty: 30 TABLET | Refills: 0 | Status: SHIPPED | OUTPATIENT
Start: 2022-08-22 | End: 2022-10-31

## 2022-08-31 ENCOUNTER — TELEPHONE (OUTPATIENT)
Dept: FAMILY MEDICINE CLINIC | Age: 37
End: 2022-08-31

## 2022-08-31 NOTE — TELEPHONE ENCOUNTER
2 identifiers verified. Pt states she believes it was the automated call system calling her to inform her regarding tomorrow's scheduled appointment. Pt was told that's most likely what it was and there was nothing noted in her chart indicating someone from the office tried to contact her. Pt informed and voiced understanding.

## 2022-08-31 NOTE — TELEPHONE ENCOUNTER
----- Message from Alizakeiko Sonido sent at 8/31/2022  2:52 PM EDT -----  Subject: Message to Provider    QUESTIONS  Information for Provider? Patient was returning a missed call. Please call   patient back to discuss. ---------------------------------------------------------------------------  --------------  Concepción Butler LZOQ  [phone]; OK to leave message on [employer], OK to respond with   electronic message via [employer] portal (only for patients who have   registered [employer] account)  ---------------------------------------------------------------------------  --------------  SCRIPT ANSWERS  Relationship to Patient?  Self

## 2022-09-01 ENCOUNTER — VIRTUAL VISIT (OUTPATIENT)
Dept: FAMILY MEDICINE CLINIC | Age: 37
End: 2022-09-01
Payer: MEDICAID

## 2022-09-01 DIAGNOSIS — G43.909 MIGRAINE WITHOUT STATUS MIGRAINOSUS, NOT INTRACTABLE, UNSPECIFIED MIGRAINE TYPE: ICD-10-CM

## 2022-09-01 DIAGNOSIS — U07.1 COVID-19: Primary | ICD-10-CM

## 2022-09-01 PROCEDURE — 99214 OFFICE O/P EST MOD 30 MIN: CPT | Performed by: FAMILY MEDICINE

## 2022-09-01 RX ORDER — IBUPROFEN 800 MG/1
800 TABLET ORAL
Qty: 60 TABLET | Refills: 1 | Status: SHIPPED | OUTPATIENT
Start: 2022-09-01 | End: 2022-10-10

## 2022-09-01 RX ORDER — BENZONATATE 200 MG/1
200 CAPSULE ORAL
Qty: 21 CAPSULE | Refills: 1 | Status: SHIPPED | OUTPATIENT
Start: 2022-09-01 | End: 2022-09-08

## 2022-09-01 NOTE — PROGRESS NOTES
THIS VISIT WAS COMPLETED VIRTUALLY USING [employer]. ME    HPI  Deidre Santiago is a 42 y.o. female who presents with COVID-19. Started feeling sick yesterday. Tested positive today. Symptoms yesterday were a really bad migraine and that has gotten better. Now has a significant cough, struggling to breathe, hurts to swallow. Breathing is affected to the point that she is having to take breath midsentence during our conversation    Son was sick earlier this week. He has asthma and took paxlovid. He is already feeling better. PMHx:  Past Medical History:   Diagnosis Date    Depression 11/28/2016    Kidney stone     Ovarian cyst     Ovarian cyst, left     UTI (urinary tract infection)        Meds:   Current Outpatient Medications   Medication Sig Dispense Refill    nirmatrelvir-ritonavir (PAXLOVID) 300 mg (150 mg x 2)-100 mg Nirmatrelvir 300 mg with ritonavir 100 mg twice daily for 5 days 1 Box 0    benzonatate (TESSALON) 200 mg capsule Take 1 Capsule by mouth three (3) times daily as needed for Cough for up to 7 days. 21 Capsule 1    ibuprofen (MOTRIN) 800 mg tablet Take 1 Tablet by mouth every eight (8) hours as needed for Pain. 60 Tablet 1    phentermine (ADIPEX-P) 37.5 mg tablet TAKE 1 TABLET BY MOUTH EVERY MORNING. MAX DAILY AMOUNT: 37.5 MG. 30 Tablet 0    DULoxetine (CYMBALTA) 20 mg capsule Take 1 Capsule by mouth in the morning. 90 Capsule 3    triamcinolone acetonide (KENALOG) 0.1 % topical cream Apply  to affected area two (2) times a day. use thin layer 80 g 1    montelukast (SINGULAIR) 10 mg tablet Take 1 Tablet by mouth daily. 90 Tablet 3    fluticasone propionate (FLONASE) 50 mcg/actuation nasal spray 2 Sprays by Both Nostrils route daily. 1 Each 5    ubrogepant (Ubrelvy) 50 mg tablet Take 1 Tablet by mouth as needed for Migraine. 12 Tablet 3    butalbital-acetaminophen-caffeine (FIORICET, ESGIC) -40 mg per tablet Take 1 Tab by mouth every six (6) hours as needed for Headache.  30 Tab 0 rizatriptan (MAXALT) 10 mg tablet TAKE 1 TABLET BY MOUTH AS NEEDED FOR MIGRAINE HEADACHE UP TO 1 DOSE. MAY  REPEAT  IN  2  HOURS  IF  NEEDED 9 Tab 0    cyclobenzaprine (FLEXERIL) 10 mg tablet Take 1 Tab by mouth two (2) times daily as needed for Muscle Spasm(s). 30 Tab 0       Allergies: Allergies   Allergen Reactions    Imitrex [Sumatriptan Succinate] Other (comments)     Made patient feel funny       Smoker:  Social History     Tobacco Use   Smoking Status Never   Smokeless Tobacco Never       ETOH:   Social History     Substance and Sexual Activity   Alcohol Use Yes    Comment: socially       FH:   Family History   Problem Relation Age of Onset    Cancer Mother     Asthma Maternal Aunt        ROS:   As listed in HPI. In addition:  Constitutional:   No headache, fever, fatigue, weight loss or weight gain      Cardiac:    No chest pain      Resp:   No cough or shortness of breath      Neuro   No loss of consciousness, dizziness, seizures      Physical Exam:  There were no vitals taken for this visit. GEN: No apparent distress. Alert and oriented and responds to all questions appropriately. NEUROLOGIC:  No focal neurologic deficits. Coordination and gait grossly intact. EXT: Well perfused. No edema. SKIN: No obvious rashes. Due to this being a TeleHealth evaluation, many elements of the physical examination are unable to be assessed. Assessment and Plan     COVID-19  Significantly short of breath  Comfortable at rest but talking causes her to be short of breath    Reviewed OTC remedies  Phenylephrine for congestion  Guaifenesin for thick mucus  Dextromethorphan for cough  Tessalon for cough  Lots of water  Tylenol/Motrin    Suggested she try her son's albuterol inhaler to see if that helps out with breathing or cough.   If she would like her own inhaler I can send that in    Has had her shots and booster    Symptoms are significant enough at this early stage I will prescribe antiviral. Paxlovid has a significant interaction with all of her as needed migraine medications and I advised her to hold on those while taking the antiviral      ICD-10-CM ICD-9-CM    1. COVID-19  U07.1 079.89 nirmatrelvir-ritonavir (PAXLOVID) 300 mg (150 mg x 2)-100 mg      benzonatate (TESSALON) 200 mg capsule      2. Migraine without status migrainosus, not intractable, unspecified migraine type  G43.909 346.90             Pursuant to the emergency declaration under the [facility], [facility] waiver authority and the [employer] Act, this Virtual  Visit was conducted, with patient's consent, to reduce the patient's risk of exposure to COVID-19 and provide continuity of care for an established patient. Services were provided through a video synchronous discussion virtually to substitute for in-person clinic visit.

## 2022-09-01 NOTE — PROGRESS NOTES
Health Maintenance Due   Topic Date Due    Hepatitis C Screening  Never done    COVID-19 Vaccine (1) Never done    Cervical cancer screen  Never done    Flu Vaccine (1) 09/01/2022     1. \"Have you been to the ER, urgent care clinic since your last visit? Hospitalized since your last visit? \" No    2. \"Have you seen or consulted any other health care providers outside of the [address] [place] [address] since your last visit? \" No     3. For patients aged 39-70: Has the patient had a colonoscopy / FIT/ Cologuard? NA - based on age      If the patient is female:    4. For patients aged 41-77: Has the patient had a mammogram within the past 2 years? NA - based on age or sex      11. For patients aged 21-65: Has the patient had a pap smear? Yes - Care Gap present.  Rooming MA/LPN to request most recent results  [facility]

## 2022-09-01 NOTE — LETTER
9/1/2022 8:29 AM    Ms. GRULLON/Chris Trotter Final            To whomever it may concern:    Please fax us the most recent pap smear results, so that we may update the patient's records for continuity of care.      Our fax number: [phone]    Patient:   Bita Louis  1985                      Sincerely,      Barry Schwartz MD

## 2022-10-10 RX ORDER — IBUPROFEN 800 MG/1
TABLET ORAL
Qty: 60 TABLET | Refills: 0 | Status: SHIPPED | OUTPATIENT
Start: 2022-10-10

## 2022-10-29 DIAGNOSIS — Z76.89 ENCOUNTER FOR WEIGHT MANAGEMENT: ICD-10-CM

## 2022-10-31 RX ORDER — PHENTERMINE HYDROCHLORIDE 37.5 MG/1
37.5 TABLET ORAL
Qty: 30 TABLET | Refills: 2 | Status: SHIPPED | OUTPATIENT
Start: 2022-10-31

## 2023-03-13 ENCOUNTER — NURSE TRIAGE (OUTPATIENT)
Dept: OTHER | Facility: CLINIC | Age: 38
End: 2023-03-13

## 2023-03-13 NOTE — TELEPHONE ENCOUNTER
[facility]    Received call from [address] at [facility] with Red Flag Complaint. Subjective: Caller states \"I am having swelling in my fingers on the left side, I am having swelling on the left leg and left side of my face is swollen\"     Current Symptoms: swelling on left side- hand, leg, and face    Onset: 2 day ago;       Pain Severity: denies    Temperature: denies     What has been tried: NA      Recommended disposition: Go to ED Now    Care advice provided, patient verbalizes understanding; denies any other questions or concerns; instructed to call back for any new or worsening symptoms. Patient/caller agrees to proceed to . Emergency Department    Attention Provider: Thank you for allowing me to participate in the care of your patient. The patient was connected to triage in response to information provided to the [facility]. Please do not respond through this encounter as the response is not directed to a shared pool. Reason for Disposition   Patient sounds very sick or weak to the triager    Protocols used:  Face Swelling-ADULT-

## 2023-03-14 ENCOUNTER — HOSPITAL ENCOUNTER (OUTPATIENT)
Dept: ULTRASOUND IMAGING | Age: 38
Discharge: HOME | End: 2023-03-14
Attending: NURSE PRACTITIONER
Payer: MEDICAID

## 2023-03-14 ENCOUNTER — VIRTUAL VISIT (OUTPATIENT)
Dept: FAMILY MEDICINE CLINIC | Age: 38
End: 2023-03-14
Payer: MEDICAID

## 2023-03-14 DIAGNOSIS — M79.89 LEFT LEG SWELLING: Primary | ICD-10-CM

## 2023-03-14 DIAGNOSIS — M79.89 LEFT LEG SWELLING: ICD-10-CM

## 2023-03-14 DIAGNOSIS — G43.909 MIGRAINE: ICD-10-CM

## 2023-03-14 PROCEDURE — 99213 OFFICE O/P EST LOW 20 MIN: CPT | Performed by: NURSE PRACTITIONER

## 2023-03-14 PROCEDURE — 93971 EXTREMITY STUDY: CPT

## 2023-03-14 RX ORDER — BUTALBITAL, ACETAMINOPHEN AND CAFFEINE 50; 325; 40 MG/1; MG/1; MG/1
1 TABLET ORAL
Qty: 30 TABLET | Refills: 2 | Status: SHIPPED | OUTPATIENT
Start: 2023-03-14

## 2023-03-14 NOTE — PROGRESS NOTES
Chief Complaint   Patient presents with    Swelling     Hands, face and left leg swelling when waking in morning. 1. Have you been to the ER, urgent care clinic since your last visit? Hospitalized since your last visit? No    2. Have you seen or consulted any other health care providers outside of the [address] [place] [address] since your last visit? Include any pap smears or colon screening.  No    Health Maintenance Due   Topic Date Due    Hepatitis C Screening  Never done    COVID-19 Vaccine (1) Never done    Flu Vaccine (1) 08/01/2022    Lipid Screen  02/15/2023

## 2023-03-14 NOTE — PROGRESS NOTES
Nicho Antoine (: 1985) is a 34 y.o. female, established patient, here for evaluation of the following chief complaint(s):   Swelling (Hands, face and left leg swelling when waking in morning.)       ASSESSMENT/PLAN:  Below is the assessment and plan developed based on review of pertinent history, labs, studies, and medications. 1. Left leg swelling  -     DUPLEX LOWER EXT VENOUS LEFT; Future  2. Migraine  -     butalbital-acetaminophen-caffeine (FIORICET, ESGIC) -40 mg per tablet; Take 1 Tablet by mouth every six (6) hours as needed for Headache., Normal, Disp-30 Tablet, R-2    Unilateral leg swelling- STAT venous duplex ordered. If negative, ?side effect from using ubrevly daily for the past 1-2 weeks. Will bring her into office tomorrow to see me and do vitals and labs, has not been seen in office in long time. At that time we can discuss preventative therapy more, consider propranolol vs elavil again. For now, only take ubrevly for abortive therapy, should not be using daily. Would like her to try the fioricet first as well as hydrating well. If HA develops into migraine then take ubrevly. Return in about 1 day (around 3/15/2023), or if symptoms worsen or fail to improve. SUBJECTIVE/OBJECTIVE:  HPI    Patient says three days ago she noted that her left leg had some swelling (pants leg is tighter than right). Also notes that in AM her hands seem swollen tood and yesterday left side of face appears a little more swollen per her daughter. No tingling or numbness. No redness. No injury recalled. Thought that the swelling may have came from salt, did eat out the other day and so she was sure the salt caused it but the swelling is not any better. She is drinking plenty of water. Has not been able to check her blood pressure. She says she has been having more migraines than usual.  Says she has had three in the past two weeks.   Has been taking the ubrevly daily for the past week or two because she is waking up daily with headaches. She is not on preventative therapy. Does not have prescription currently for fioricet. Has been on several medicines in the past for the migraines but has hard time recalling them all and if any side effects. Topamax side effect per chart review caused \"loopy feeling\"  Was on elavil but does not recall if helped or not   Was also on atenolol, not sure why she stopped that.             Review of Systems   Gen: + fatigue, -fever, -chills  Eyes: no excessive tearing, itching, or discharge  Nose: no rhinorrhea, no sinus pain  Mouth: no oral lesions, no sore throat  Resp: no shortness of breath, no wheezing, no cough  CV: no chest pain, no paroxysmal nocturnal dyspnea  Abd:+ nausea with HA occasionally, no heartburn, no diarrhea, no constipation, no abdominal pain  Neuro: + headaches, no syncope or presyncopal episodes  Endo: no polyuria, no polydipsia  Heme: no lymphadenopathy, no easy bruising or bleeding    No data recorded     Physical Exam    [INSTRUCTIONS:  \"[x]\" Indicates a positive item  \"[]\" Indicates a negative item  -- DELETE ALL ITEMS NOT EXAMINED]    Constitutional: [x] Appears well-developed and well-nourished [x] No apparent distress      [] Abnormal -     Mental status: [x] Alert and awake  [x] Oriented to person/place/time [x] Able to follow commands    [] Abnormal -     Eyes:   EOM    [x]  Normal    [] Abnormal -   Sclera  [x]  Normal    [] Abnormal -          Discharge [x]  None visible   [] Abnormal -     HENT: [x] Normocephalic, atraumatic  [] Abnormal -   [x] Mouth/Throat: Mucous membranes are moist    External Ears [x] Normal  [] Abnormal -    Neck: [x] No visualized mass [] Abnormal -     Pulmonary/Chest: [x] Respiratory effort normal   [x] No visualized signs of difficulty breathing or respiratory distress        [] Abnormal -      Musculoskeletal:   [x] Normal gait with no signs of ataxia         [x] Normal range of motion of neck [x] Abnormal - video of left leg does appear larger than right leg. Neurological:        [x] No Facial Asymmetry (Cranial nerve 7 motor function) (limited exam due to video visit)          [x] No gaze palsy        [] Abnormal -          Skin:        [x] No significant exanthematous lesions or discoloration noted on facial skin         [] Abnormal -            Psychiatric:       [x] Normal Affect [] Abnormal -        [x] No Hallucinations          Azell Rossana, was evaluated through a synchronous (real-time) audio-video encounter. The patient (or guardian if applicable) is aware that this is a billable service, which includes applicable co-pays. This Virtual Visit was conducted with patient's (and/or legal guardian's) consent. The visit was conducted pursuant to the emergency declaration under the [address] [place] [address] authority and the [employer] Act. Patient identification was verified, and a caregiver was present when appropriate. The patient was located at: Home: [address] [place] [address]  The provider was located at: Facility (Appt Department): [facility] [address] [place] [address]       An electronic signature was used to authenticate this note.   -- Maricarmen Li NP

## 2023-03-15 ENCOUNTER — OFFICE VISIT (OUTPATIENT)
Dept: FAMILY MEDICINE CLINIC | Age: 38
End: 2023-03-15
Payer: MEDICAID

## 2023-03-15 VITALS
DIASTOLIC BLOOD PRESSURE: 88 MMHG | SYSTOLIC BLOOD PRESSURE: 133 MMHG | OXYGEN SATURATION: 100 % | RESPIRATION RATE: 20 BRPM | HEART RATE: 72 BPM | TEMPERATURE: 98.3 F | BODY MASS INDEX: 26.5 KG/M2 | HEIGHT: 64 IN | WEIGHT: 155.2 LBS

## 2023-03-15 DIAGNOSIS — R73.9 ELEVATED BLOOD SUGAR: ICD-10-CM

## 2023-03-15 DIAGNOSIS — R03.0 ELEVATED BLOOD PRESSURE READING: ICD-10-CM

## 2023-03-15 DIAGNOSIS — R30.0 DYSURIA: ICD-10-CM

## 2023-03-15 DIAGNOSIS — Z11.59 ENCOUNTER FOR HEPATITIS C SCREENING TEST FOR LOW RISK PATIENT: ICD-10-CM

## 2023-03-15 DIAGNOSIS — G43.909 MIGRAINE WITHOUT STATUS MIGRAINOSUS, NOT INTRACTABLE, UNSPECIFIED MIGRAINE TYPE: ICD-10-CM

## 2023-03-15 DIAGNOSIS — R60.9 SWELLING: Primary | ICD-10-CM

## 2023-03-15 DIAGNOSIS — G47.00 INSOMNIA, UNSPECIFIED TYPE: ICD-10-CM

## 2023-03-15 DIAGNOSIS — N30.00 ACUTE CYSTITIS WITHOUT HEMATURIA: ICD-10-CM

## 2023-03-15 DIAGNOSIS — R53.83 FATIGUE, UNSPECIFIED TYPE: ICD-10-CM

## 2023-03-15 LAB
BILIRUB UR QL STRIP: NEGATIVE
GLUCOSE UR-MCNC: NEGATIVE MG/DL
KETONES P FAST UR STRIP-MCNC: NEGATIVE MG/DL
PH UR STRIP: 6 [PH] (ref 4.6–8)
PROT UR QL STRIP: NEGATIVE
SP GR UR STRIP: 1.02 (ref 1–1.03)
UA UROBILINOGEN AMB POC: NORMAL (ref 0.2–1)
URINALYSIS CLARITY POC: NORMAL
URINALYSIS COLOR POC: YELLOW
URINE BLOOD POC: NORMAL
URINE LEUKOCYTES POC: NORMAL
URINE NITRITES POC: POSITIVE

## 2023-03-15 PROCEDURE — 99214 OFFICE O/P EST MOD 30 MIN: CPT | Performed by: NURSE PRACTITIONER

## 2023-03-15 PROCEDURE — 81002 URINALYSIS NONAUTO W/O SCOPE: CPT | Performed by: NURSE PRACTITIONER

## 2023-03-15 RX ORDER — NITROFURANTOIN 25; 75 MG/1; MG/1
100 CAPSULE ORAL 2 TIMES DAILY
Qty: 14 CAPSULE | Refills: 0 | Status: SHIPPED | OUTPATIENT
Start: 2023-03-15 | End: 2023-03-22

## 2023-03-15 RX ORDER — AMITRIPTYLINE HYDROCHLORIDE 25 MG/1
25 TABLET, FILM COATED ORAL
Qty: 90 TABLET | Refills: 1 | Status: SHIPPED | OUTPATIENT
Start: 2023-03-15

## 2023-03-15 NOTE — PROGRESS NOTES
Subjective:     Chief Complaint   Patient presents with    Leg Swelling        HPI:  34 y.o.  presents for follow up appointment. Saw her yesterday virtually for swelling on left side of body, particularly left leg. Today swelling is better she says. Sent for STAT venous duplex to rule out DVT, was negative. Did a UA on her, came back with +nitrates and WBC  No UTI symptoms but says she has had UTI in the past and never had any typical symptoms. She says that she urinates about 3 times per day. Drinks plenty of water, just started to drink more water in the past week. No flank or back pain, did have left lower quadrant pain last week but says she had endometriosis and typically has cramps there around her menses (which was last week), those pains have resolved. She has been eating low salt diet and drinking plenty of water. Has not taken phentermine for couple weeks, was taking it every other day for stimulant effect but has not taken for few weeks. Says she is still fatigue and cannot sleep well at night. She is unsure if she snores or stopped breathing while sleeping. She has never had sleep study done. No hospital, ER or specialist visits since last primary care visit except as noted above. Past Medical History:   Diagnosis Date    Depression 11/28/2016    Kidney stone     Ovarian cyst     Ovarian cyst, left     UTI (urinary tract infection)        Social History     Tobacco Use    Smoking status: Never    Smokeless tobacco: Never   Vaping Use    Vaping Use: Never used   Substance Use Topics    Alcohol use: Yes     Comment: socially    Drug use: No       Outpatient Medications Marked as Taking for the 3/15/23 encounter (Office Visit) with Baylee Castañeda NP   Medication Sig Dispense Refill    butalbital-acetaminophen-caffeine (FIORICET, ESGIC) -40 mg per tablet Take 1 Tablet by mouth every six (6) hours as needed for Headache.  30 Tablet 2    phentermine (ADIPEX-P) 37.5 mg tablet TAKE 1 TABLET BY MOUTH EVERY MORNING. MAX DAILY AMOUNT: 37.5 MG. 30 Tablet 2    ibuprofen (MOTRIN) 800 mg tablet TAKE 1 TABLET BY MOUTH EVERY EIGHT HOURS AS NEEDED FOR PAIN. 60 Tablet 0    DULoxetine (CYMBALTA) 20 mg capsule Take 1 Capsule by mouth in the morning. 90 Capsule 3    triamcinolone acetonide (KENALOG) 0.1 % topical cream Apply  to affected area two (2) times a day. use thin layer 80 g 1    montelukast (SINGULAIR) 10 mg tablet Take 1 Tablet by mouth daily. 90 Tablet 3    fluticasone propionate (FLONASE) 50 mcg/actuation nasal spray 2 Sprays by Both Nostrils route daily. 1 Each 5    ubrogepant (Ubrelvy) 50 mg tablet Take 1 Tablet by mouth as needed for Migraine. 12 Tablet 3    cyclobenzaprine (FLEXERIL) 10 mg tablet Take 1 Tab by mouth two (2) times daily as needed for Muscle Spasm(s). 30 Tab 0       Allergies   Allergen Reactions    Imitrex [Sumatriptan Succinate] Other (comments)     Made patient feel funny       Health Maintenance reviewed       ROS:  Gen: + fatigue, no fever, no chills,   Eyes: no excessive tearing, itching, or discharge  Nose: no rhinorrhea, no sinus pain  Mouth: no oral lesions, no sore throat, no difficulty swallowing  Resp: no shortness of breath, no wheezing, no cough  CV: no chest pain, no orthopnea, no paroxysmal nocturnal dyspnea, no lower extremity edema, no palpitations  Abd: no nausea, no heartburn, no diarrhea, no constipation, no abdominal pain  Neuro: + headaches, no syncope or presyncopal episodes  Endo: no polyuria, no polydipsia.     : no hematuria, no dysuria, no frequency, no incontinence  MSK: no joint pain or swelling    PE:  Visit Vitals  /88   Pulse 72   Temp 98.3 °F (36.8 °C) (Temporal)   Resp 20   Ht 5' 4\" (1.626 m)   Wt 155 lb 3.2 oz (70.4 kg)   LMP 03/08/2023   SpO2 100%   BMI 26.64 kg/m²     Gen: alert, oriented, no acute distress  Head: normocephalic, atraumatic  Oral: moist mucus membranes, no oral lesions, no pharyngeal inflammation or exudate  Neck: symmetric normal sized thyroid, no carotid bruits, no jugular vein distention  Resp: no increase work of breathing, lungs clear to ausculation bilaterally, no wheezing, rales or rhonchi  CV: S1, S2 normal.  No murmurs, rubs, or gallops. Abd: soft, not tender, not distended. No hepatosplenomegaly. Normal bowel sounds. No hernias. No abdominal or renal bruits. Neuro: cranial nerves intact, normal strength and movement in all extremities, and sensation intact and symmetric. Skin: no lesion or rash  Extremities: no cyanosis. Trace left lower leg edema    Results for orders placed or performed in visit on 03/15/23   AMB POC URINALYSIS DIP STICK MANUAL W/O MICRO   Result Value Ref Range    Color (UA POC) Yellow     Clarity (UA POC) Cloudy     Glucose (UA POC) Negative Negative    Bilirubin (UA POC) Negative Negative    Ketones (UA POC) Negative Negative    Specific gravity (UA POC) 1.025 1.001 - 1.035    Blood (UA POC) Trace Negative    pH (UA POC) 6.0 4.6 - 8.0    Protein (UA POC) Negative Negative    Urobilinogen (UA POC) 0.2 mg/dL 0.2 - 1    Nitrites (UA POC) Positive Negative    Leukocyte esterase (UA POC) 4+ Negative       Assessment/Plan:  Differential diagnosis and treatment options reviewed with patient who is in agreement with treatment plan as outlined below. ICD-10-CM ICD-9-CM    1. Swelling  R60.9 782.3 CULTURE, URINE      AMB POC URINALYSIS DIP STICK MANUAL W/O MICRO      CULTURE, URINE      2. Dysuria  R30.0 788. 1 CULTURE, URINE      AMB POC URINALYSIS DIP STICK MANUAL W/O MICRO      CULTURE, URINE      3. Elevated blood sugar  R73.9 790.29 HEMOGLOBIN A1C WITH EAG      HEMOGLOBIN A1C WITH EAG      4. Fatigue, unspecified type  R53.83 780.79 SLEEP MEDICINE REFERRAL      5. Elevated blood pressure reading  R03.0 796.2 SLEEP MEDICINE REFERRAL      6. Insomnia, unspecified type  G47.00 780.52 SLEEP MEDICINE REFERRAL      amitriptyline (ELAVIL) 25 mg tablet      7.  Encounter for hepatitis C screening test for low risk patient  Z11.59 V73.89 HEPATITIS C AB      HEPATITIS C AB      8. Migraine without status migrainosus, not intractable, unspecified migraine type  G43.909 346.90 amitriptyline (ELAVIL) 25 mg tablet      9. Acute cystitis without hematuria  N30.00 595.0 nitrofurantoin, macrocrystal-monohydrate, (Macrobid) 100 mg capsule        Routine labs today  Increase water intake  Will treat for UTI and send for UC  Will start on elavil for insomnia and migraine prevention. DASH diet. Refer to sleep medicine, not sure if she snores but does not sleep well. She has had borderline elevated diastolic BP readings as well. Consider HCTZ if labs are stable after completing her antibiotic. May help with intermittent swelling and BP. Discussed BMI and healthy weight. Encouraged patient to work to implement changes including diet high in raw fruits and vegetables, lean protein and good fats. Limit refined, processed carbohydrates and sugar. Encouraged regular exercise. Recommended regular cardiovascular exercise 3-6 times per week for 30-60 minutes daily. I have discussed the diagnosis with the patient and the intended plan as seen in the above orders. The patient has received an after-visit summary and questions were answered concerning future plans. I have discussed medication side effects and warnings with the patient as well. The patient verbalizes understanding and agreement with the plan.

## 2023-03-20 ENCOUNTER — TELEPHONE (OUTPATIENT)
Dept: FAMILY MEDICINE CLINIC | Age: 38
End: 2023-03-20

## 2023-03-20 LAB
25(OH)D3+25(OH)D2 SERPL-MCNC: 17.8 NG/ML (ref 30–100)
ALBUMIN SERPL-MCNC: 4.7 G/DL (ref 3.8–4.8)
ALBUMIN/GLOB SERPL: 1.7 {RATIO} (ref 1.2–2.2)
ALP SERPL-CCNC: 69 IU/L (ref 44–121)
ALT SERPL-CCNC: 10 IU/L (ref 0–32)
AST SERPL-CCNC: 16 IU/L (ref 0–40)
BACTERIA UR CULT: ABNORMAL
BACTERIA UR CULT: ABNORMAL
BASOPHILS # BLD AUTO: 0.1 X10E3/UL (ref 0–0.2)
BASOPHILS NFR BLD AUTO: 1 %
BILIRUB SERPL-MCNC: 0.5 MG/DL (ref 0–1.2)
BUN SERPL-MCNC: 13 MG/DL (ref 6–24)
BUN/CREAT SERPL: 20 (ref 9–23)
CALCIUM SERPL-MCNC: 8.9 MG/DL (ref 8.7–10.2)
CHLORIDE SERPL-SCNC: 103 MMOL/L (ref 96–106)
CHOLEST SERPL-MCNC: 160 MG/DL (ref 100–199)
CO2 SERPL-SCNC: 21 MMOL/L (ref 20–29)
CREAT SERPL-MCNC: 0.66 MG/DL (ref 0.57–1)
EGFRCR SERPLBLD CKD-EPI 2021: 114 ML/MIN/1.73
EOSINOPHIL # BLD AUTO: 0.3 X10E3/UL (ref 0–0.4)
EOSINOPHIL NFR BLD AUTO: 4 %
ERYTHROCYTE [DISTWIDTH] IN BLOOD BY AUTOMATED COUNT: 12.6 % (ref 11.7–15.4)
EST. AVERAGE GLUCOSE BLD GHB EST-MCNC: 97 MG/DL
FERRITIN SERPL-MCNC: 116 NG/ML (ref 15–150)
FOLATE SERPL-MCNC: 3.2 NG/ML
GLOBULIN SER CALC-MCNC: 2.7 G/DL (ref 1.5–4.5)
GLUCOSE SERPL-MCNC: 77 MG/DL (ref 70–99)
HBA1C MFR BLD: 5 % (ref 4.8–5.6)
HCT VFR BLD AUTO: 40.4 % (ref 34–46.6)
HCV IGG SERPL QL IA: NON REACTIVE
HDLC SERPL-MCNC: 44 MG/DL
HGB BLD-MCNC: 13.6 G/DL (ref 11.1–15.9)
IMM GRANULOCYTES # BLD AUTO: 0 X10E3/UL (ref 0–0.1)
IMM GRANULOCYTES NFR BLD AUTO: 0 %
IMP & REVIEW OF LAB RESULTS: NORMAL
IRON SATN MFR SERPL: 34 % (ref 15–55)
IRON SERPL-MCNC: 89 UG/DL (ref 27–159)
LDLC SERPL CALC-MCNC: 94 MG/DL (ref 0–99)
LYMPHOCYTES # BLD AUTO: 2 X10E3/UL (ref 0.7–3.1)
LYMPHOCYTES NFR BLD AUTO: 32 %
MCH RBC QN AUTO: 32.9 PG (ref 26.6–33)
MCHC RBC AUTO-ENTMCNC: 33.7 G/DL (ref 31.5–35.7)
MCV RBC AUTO: 98 FL (ref 79–97)
MONOCYTES # BLD AUTO: 0.4 X10E3/UL (ref 0.1–0.9)
MONOCYTES NFR BLD AUTO: 6 %
NEUTROPHILS # BLD AUTO: 3.6 X10E3/UL (ref 1.4–7)
NEUTROPHILS NFR BLD AUTO: 57 %
PLATELET # BLD AUTO: 196 X10E3/UL (ref 150–450)
POTASSIUM SERPL-SCNC: 4.2 MMOL/L (ref 3.5–5.2)
PROT SERPL-MCNC: 7.4 G/DL (ref 6–8.5)
RBC # BLD AUTO: 4.13 X10E6/UL (ref 3.77–5.28)
SODIUM SERPL-SCNC: 141 MMOL/L (ref 134–144)
TIBC SERPL-MCNC: 259 UG/DL (ref 250–450)
TRIGL SERPL-MCNC: 123 MG/DL (ref 0–149)
TSH SERPL DL<=0.005 MIU/L-ACNC: 1.83 UIU/ML (ref 0.45–4.5)
UIBC SERPL-MCNC: 170 UG/DL (ref 131–425)
VIT B12 SERPL-MCNC: 324 PG/ML (ref 232–1245)
VLDLC SERPL CALC-MCNC: 22 MG/DL (ref 5–40)
WBC # BLD AUTO: 6.3 X10E3/UL (ref 3.4–10.8)

## 2023-03-20 RX ORDER — ERGOCALCIFEROL 1.25 MG/1
50000 CAPSULE ORAL
Qty: 12 CAPSULE | Refills: 0 | Status: SHIPPED | OUTPATIENT
Start: 2023-03-20

## 2023-03-20 NOTE — TELEPHONE ENCOUNTER
Pt is calling because the RX nitrofurantoin, macrocrystal-monohydrate, (Macrobid) 100 mg capsule is making her vomit. Pt also states that her urine has not changed colors.     Pt is requesting a different rx please

## 2023-03-22 DIAGNOSIS — Z76.89 ENCOUNTER FOR WEIGHT MANAGEMENT: ICD-10-CM

## 2023-03-22 NOTE — TELEPHONE ENCOUNTER
If she took for 5 days that may be enough to clear infection. Lets have her do repeat UC one day next week to make sure infection is cleared.

## 2023-03-24 RX ORDER — PHENTERMINE HYDROCHLORIDE 37.5 MG/1
37.5 TABLET ORAL
Qty: 30 TABLET | Refills: 1 | Status: SHIPPED | OUTPATIENT
Start: 2023-03-24

## 2023-06-06 ENCOUNTER — TELEPHONE (OUTPATIENT)
Age: 38
End: 2023-06-06

## 2023-06-06 NOTE — TELEPHONE ENCOUNTER
verified. Pt states her mouth is swollen and painful/sore to touch due to her tooth. Pt states she called her dentist but they could not see her for another 2 months and informed her to contact her PCP. Scheduled pt a VV appt for tomorrow to further discuss symptoms/medication needed (okay per Bassem Gomez NP). Pt verified understanding and had no further questions.

## 2023-06-06 NOTE — TELEPHONE ENCOUNTER
Pt is calling stating mouth is swollen due to her tooth and she can't get into her dentis for 2 months want to know if you could call in an antibiotic for her

## 2023-07-06 ENCOUNTER — TELEMEDICINE (OUTPATIENT)
Age: 38
End: 2023-07-06
Payer: COMMERCIAL

## 2023-07-06 DIAGNOSIS — F41.1 GAD (GENERALIZED ANXIETY DISORDER): Primary | ICD-10-CM

## 2023-07-06 PROCEDURE — 99214 OFFICE O/P EST MOD 30 MIN: CPT | Performed by: FAMILY MEDICINE

## 2023-07-06 RX ORDER — FLUOXETINE HYDROCHLORIDE 20 MG/1
20 CAPSULE ORAL DAILY
Qty: 30 CAPSULE | Refills: 3 | Status: SHIPPED | OUTPATIENT
Start: 2023-07-06

## 2023-07-06 SDOH — ECONOMIC STABILITY: HOUSING INSECURITY
IN THE LAST 12 MONTHS, WAS THERE A TIME WHEN YOU DID NOT HAVE A STEADY PLACE TO SLEEP OR SLEPT IN A SHELTER (INCLUDING NOW)?: NO

## 2023-07-06 SDOH — ECONOMIC STABILITY: FOOD INSECURITY: WITHIN THE PAST 12 MONTHS, THE FOOD YOU BOUGHT JUST DIDN'T LAST AND YOU DIDN'T HAVE MONEY TO GET MORE.: NEVER TRUE

## 2023-07-06 SDOH — ECONOMIC STABILITY: INCOME INSECURITY: HOW HARD IS IT FOR YOU TO PAY FOR THE VERY BASICS LIKE FOOD, HOUSING, MEDICAL CARE, AND HEATING?: NOT HARD AT ALL

## 2023-07-06 SDOH — ECONOMIC STABILITY: FOOD INSECURITY: WITHIN THE PAST 12 MONTHS, YOU WORRIED THAT YOUR FOOD WOULD RUN OUT BEFORE YOU GOT MONEY TO BUY MORE.: NEVER TRUE

## 2023-07-06 ASSESSMENT — PATIENT HEALTH QUESTIONNAIRE - PHQ9
SUM OF ALL RESPONSES TO PHQ9 QUESTIONS 1 & 2: 0
SUM OF ALL RESPONSES TO PHQ QUESTIONS 1-9: 0
3. TROUBLE FALLING OR STAYING ASLEEP: 0
SUM OF ALL RESPONSES TO PHQ QUESTIONS 1-9: 0
4. FEELING TIRED OR HAVING LITTLE ENERGY: 0
6. FEELING BAD ABOUT YOURSELF - OR THAT YOU ARE A FAILURE OR HAVE LET YOURSELF OR YOUR FAMILY DOWN: 0
10. IF YOU CHECKED OFF ANY PROBLEMS, HOW DIFFICULT HAVE THESE PROBLEMS MADE IT FOR YOU TO DO YOUR WORK, TAKE CARE OF THINGS AT HOME, OR GET ALONG WITH OTHER PEOPLE: 0
SUM OF ALL RESPONSES TO PHQ QUESTIONS 1-9: 0
1. LITTLE INTEREST OR PLEASURE IN DOING THINGS: 0
5. POOR APPETITE OR OVEREATING: 0
1. LITTLE INTEREST OR PLEASURE IN DOING THINGS: 0
SUM OF ALL RESPONSES TO PHQ QUESTIONS 1-9: 0
2. FEELING DOWN, DEPRESSED OR HOPELESS: 0
SUM OF ALL RESPONSES TO PHQ QUESTIONS 1-9: 0
SUM OF ALL RESPONSES TO PHQ QUESTIONS 1-9: 0
SUM OF ALL RESPONSES TO PHQ9 QUESTIONS 1 & 2: 0
8. MOVING OR SPEAKING SO SLOWLY THAT OTHER PEOPLE COULD HAVE NOTICED. OR THE OPPOSITE, BEING SO FIGETY OR RESTLESS THAT YOU HAVE BEEN MOVING AROUND A LOT MORE THAN USUAL: 0
7. TROUBLE CONCENTRATING ON THINGS, SUCH AS READING THE NEWSPAPER OR WATCHING TELEVISION: 0
9. THOUGHTS THAT YOU WOULD BE BETTER OFF DEAD, OR OF HURTING YOURSELF: 0
2. FEELING DOWN, DEPRESSED OR HOPELESS: 0

## 2023-07-06 NOTE — PROGRESS NOTES
THIS VISIT WAS COMPLETED VIRTUALLY USING [employer] Virtual Visit    HPI  Darrell Tejada is a 34 y.o. female who presents with anxiety. Visit was prompted because she has noticed that when she gets stressed recently that she has developed a twitch of her left eye and her left cheek. She has had other physical manifestations of her stress before. Has had this recurrent \"stress rash\" that started around the time that her first child was born. This has been thoroughly worked up by dermatology. Interestingly she gets even more stressed whenever she sees this rash because she will wonder what she is getting so stressed about and it will spiral from there. After further discussion she does not feel that she has control of her anxiety. She feels like it has control of her. She is sweating the small stuff. She is losing sleep over this. Job sounds like it is being affected. She feels \"crazy\". She has been on Cymbalta 20 mg but this gave her a zombie side effect so she stopped it    PMHx:  Past Medical History:   Diagnosis Date    Depression 11/28/2016    Kidney stone     Ovarian cyst     Ovarian cyst, left     UTI (urinary tract infection)        Meds:   Current Outpatient Medications   Medication Sig Dispense Refill    FLUoxetine (PROZAC) 20 MG capsule Take 1 capsule by mouth daily 30 capsule 3    ibuprofen (ADVIL;MOTRIN) 800 MG tablet TAKE 1 TABLET BY MOUTH EVERY EIGHT HOURS AS NEEDED FOR PAIN.  60 tablet 3    butalbital-acetaminophen-caffeine (FIORICET, ESGIC) -40 MG per tablet Take 1 tablet by mouth every 6 hours as needed      cyclobenzaprine (FLEXERIL) 10 MG tablet Take by mouth 2 times daily as needed      fluticasone (FLONASE) 50 MCG/ACT nasal spray 2 sprays by Nasal route daily      montelukast (SINGULAIR) 10 MG tablet Take by mouth daily      phentermine (ADIPEX-P) 37.5 MG tablet Take by mouth.      rizatriptan (MAXALT) 10 MG tablet TAKE 1 TABLET BY MOUTH AS NEEDED FOR MIGRAINE HEADACHE UP TO 1

## 2023-07-06 NOTE — PROGRESS NOTES
Chief Complaint   Patient presents with    Eye Problem         Health Maintenance Due   Topic Date Due    COVID-19 Vaccine (1) Never done    Varicella vaccine (1 of 2 - 2-dose childhood series) Never done    DTaP/Tdap/Td vaccine (1 - Tdap) Never done           1. \"Have you been to the ER, urgent care clinic since your last visit? Hospitalized since your last visit? \" No    2. \"Have you seen or consulted any other health care providers outside of the [address] [place] [address] since your last visit? \" No     3. For patients aged 43-73: Has the patient had a colonoscopy / FIT/ Cologuard? NA - based on age      If the patient is female:    4. For patients aged 43-66: Has the patient had a mammogram within the past 2 years? Yes - Care Gap present. Most recent result on file      5. For patients aged 21-65: Has the patient had a pap smear? Yes - Care Gap present.  Most recent result on file